# Patient Record
Sex: MALE | Race: WHITE | Employment: OTHER | URBAN - METROPOLITAN AREA
[De-identification: names, ages, dates, MRNs, and addresses within clinical notes are randomized per-mention and may not be internally consistent; named-entity substitution may affect disease eponyms.]

---

## 2022-01-01 ENCOUNTER — HOSPITAL ENCOUNTER (INPATIENT)
Age: 87
LOS: 1 days | DRG: 951 | End: 2022-04-22
Attending: EMERGENCY MEDICINE | Admitting: INTERNAL MEDICINE
Payer: MEDICARE

## 2022-01-01 ENCOUNTER — APPOINTMENT (OUTPATIENT)
Dept: GENERAL RADIOLOGY | Age: 87
DRG: 492 | End: 2022-01-01
Attending: ORTHOPAEDIC SURGERY
Payer: MEDICARE

## 2022-01-01 ENCOUNTER — APPOINTMENT (OUTPATIENT)
Dept: INTERVENTIONAL RADIOLOGY/VASCULAR | Age: 87
DRG: 492 | End: 2022-01-01
Attending: ORTHOPAEDIC SURGERY
Payer: MEDICARE

## 2022-01-01 ENCOUNTER — APPOINTMENT (OUTPATIENT)
Dept: GENERAL RADIOLOGY | Age: 87
DRG: 951 | End: 2022-01-01
Payer: MEDICARE

## 2022-01-01 ENCOUNTER — HOSPITAL ENCOUNTER (INPATIENT)
Age: 87
LOS: 7 days | Discharge: NURSING, REHAB OR LONG-TERM CARE | DRG: 492 | End: 2022-04-07
Attending: ORTHOPAEDIC SURGERY | Admitting: ORTHOPAEDIC SURGERY
Payer: MEDICARE

## 2022-01-01 ENCOUNTER — APPOINTMENT (OUTPATIENT)
Dept: CT IMAGING | Age: 87
DRG: 951 | End: 2022-01-01
Payer: MEDICARE

## 2022-01-01 ENCOUNTER — ANESTHESIA (OUTPATIENT)
Dept: OPERATING ROOM | Age: 87
DRG: 492 | End: 2022-01-01
Payer: MEDICARE

## 2022-01-01 ENCOUNTER — ANESTHESIA EVENT (OUTPATIENT)
Dept: OPERATING ROOM | Age: 87
DRG: 492 | End: 2022-01-01
Payer: MEDICARE

## 2022-01-01 VITALS
RESPIRATION RATE: 11 BRPM | HEIGHT: 63 IN | OXYGEN SATURATION: 83 % | TEMPERATURE: 94.4 F | SYSTOLIC BLOOD PRESSURE: 50 MMHG | DIASTOLIC BLOOD PRESSURE: 43 MMHG | HEART RATE: 115 BPM | WEIGHT: 139.11 LBS | BODY MASS INDEX: 24.65 KG/M2

## 2022-01-01 VITALS
HEART RATE: 68 BPM | OXYGEN SATURATION: 92 % | TEMPERATURE: 97.5 F | BODY MASS INDEX: 22.77 KG/M2 | WEIGHT: 128.53 LBS | DIASTOLIC BLOOD PRESSURE: 64 MMHG | HEIGHT: 63 IN | SYSTOLIC BLOOD PRESSURE: 107 MMHG | RESPIRATION RATE: 16 BRPM

## 2022-01-01 VITALS
SYSTOLIC BLOOD PRESSURE: 99 MMHG | DIASTOLIC BLOOD PRESSURE: 67 MMHG | TEMPERATURE: 96.8 F | OXYGEN SATURATION: 92 % | RESPIRATION RATE: 16 BRPM

## 2022-01-01 DIAGNOSIS — R74.01 TRANSAMINITIS: ICD-10-CM

## 2022-01-01 DIAGNOSIS — T85.848A PAIN DUE TO ANY DEVICE, IMPLANT OR GRAFT, INITIAL ENCOUNTER: ICD-10-CM

## 2022-01-01 DIAGNOSIS — I48.91 ATRIAL FIBRILLATION WITH RVR (HCC): ICD-10-CM

## 2022-01-01 DIAGNOSIS — T84.84XA PAINFUL ORTHOPAEDIC HARDWARE (HCC): Primary | ICD-10-CM

## 2022-01-01 DIAGNOSIS — J18.9 PNEUMONIA OF BOTH LUNGS DUE TO INFECTIOUS ORGANISM, UNSPECIFIED PART OF LUNG: ICD-10-CM

## 2022-01-01 DIAGNOSIS — J96.01 ACUTE HYPOXEMIC RESPIRATORY FAILURE (HCC): Primary | ICD-10-CM

## 2022-01-01 LAB
A/G RATIO: 0.7 (ref 1.1–2.2)
A/G RATIO: 0.8 (ref 1.1–2.2)
ALBUMIN SERPL-MCNC: 3 G/DL (ref 3.4–5)
ALBUMIN SERPL-MCNC: 3.1 G/DL (ref 3.4–5)
ALBUMIN SERPL-MCNC: 3.2 G/DL (ref 3.4–5)
ALBUMIN SERPL-MCNC: 3.4 G/DL (ref 3.4–5)
ALBUMIN SERPL-MCNC: 3.4 G/DL (ref 3.4–5)
ALBUMIN SERPL-MCNC: 3.5 G/DL (ref 3.4–5)
ALP BLD-CCNC: 537 U/L (ref 40–129)
ALP BLD-CCNC: 589 U/L (ref 40–129)
ALT SERPL-CCNC: 2100 U/L (ref 10–40)
ALT SERPL-CCNC: 2224 U/L (ref 10–40)
ANAEROBIC CULTURE: ABNORMAL
ANION GAP SERPL CALCULATED.3IONS-SCNC: 10 MMOL/L (ref 3–16)
ANION GAP SERPL CALCULATED.3IONS-SCNC: 11 MMOL/L (ref 3–16)
ANION GAP SERPL CALCULATED.3IONS-SCNC: 11 MMOL/L (ref 3–16)
ANION GAP SERPL CALCULATED.3IONS-SCNC: 14 MMOL/L (ref 3–16)
ANION GAP SERPL CALCULATED.3IONS-SCNC: 19 MMOL/L (ref 3–16)
ANION GAP SERPL CALCULATED.3IONS-SCNC: 20 MMOL/L (ref 3–16)
ANION GAP SERPL CALCULATED.3IONS-SCNC: 8 MMOL/L (ref 3–16)
ANION GAP SERPL CALCULATED.3IONS-SCNC: 9 MMOL/L (ref 3–16)
APTT: 33.1 SEC (ref 26.2–38.6)
AST SERPL-CCNC: 2605 U/L (ref 15–37)
AST SERPL-CCNC: 2773 U/L (ref 15–37)
BACTERIA: ABNORMAL /HPF
BACTERIA: ABNORMAL /HPF
BASE EXCESS VENOUS: -13.6 MMOL/L (ref -3–3)
BASE EXCESS VENOUS: 0.3 MMOL/L (ref -3–3)
BASOPHILS ABSOLUTE: 0 K/UL (ref 0–0.2)
BASOPHILS ABSOLUTE: 0 K/UL (ref 0–0.2)
BASOPHILS RELATIVE PERCENT: 0.2 %
BASOPHILS RELATIVE PERCENT: 0.9 %
BILIRUB SERPL-MCNC: 1.1 MG/DL (ref 0–1)
BILIRUB SERPL-MCNC: 1.1 MG/DL (ref 0–1)
BILIRUBIN URINE: NEGATIVE
BILIRUBIN URINE: NEGATIVE
BLOOD CULTURE, ROUTINE: NORMAL
BLOOD, URINE: ABNORMAL
BLOOD, URINE: NEGATIVE
BUN BLDV-MCNC: 17 MG/DL (ref 7–20)
BUN BLDV-MCNC: 20 MG/DL (ref 7–20)
BUN BLDV-MCNC: 21 MG/DL (ref 7–20)
BUN BLDV-MCNC: 27 MG/DL (ref 7–20)
BUN BLDV-MCNC: 35 MG/DL (ref 7–20)
BUN BLDV-MCNC: 43 MG/DL (ref 7–20)
BUN BLDV-MCNC: 63 MG/DL (ref 7–20)
BUN BLDV-MCNC: 64 MG/DL (ref 7–20)
C-REACTIVE PROTEIN: 44.8 MG/L (ref 0–5.1)
C-REACTIVE PROTEIN: 62.6 MG/L (ref 0–5.1)
CALCIUM SERPL-MCNC: 8.5 MG/DL (ref 8.3–10.6)
CALCIUM SERPL-MCNC: 8.8 MG/DL (ref 8.3–10.6)
CALCIUM SERPL-MCNC: 8.9 MG/DL (ref 8.3–10.6)
CALCIUM SERPL-MCNC: 9 MG/DL (ref 8.3–10.6)
CALCIUM SERPL-MCNC: 9 MG/DL (ref 8.3–10.6)
CALCIUM SERPL-MCNC: 9.1 MG/DL (ref 8.3–10.6)
CALCIUM SERPL-MCNC: 9.2 MG/DL (ref 8.3–10.6)
CALCIUM SERPL-MCNC: 9.3 MG/DL (ref 8.3–10.6)
CARBOXYHEMOGLOBIN: 1.6 % (ref 0–1.5)
CARBOXYHEMOGLOBIN: 2.5 % (ref 0–1.5)
CHLORIDE BLD-SCNC: 100 MMOL/L (ref 99–110)
CHLORIDE BLD-SCNC: 101 MMOL/L (ref 99–110)
CHLORIDE BLD-SCNC: 103 MMOL/L (ref 99–110)
CHLORIDE BLD-SCNC: 104 MMOL/L (ref 99–110)
CHLORIDE BLD-SCNC: 106 MMOL/L (ref 99–110)
CLARITY: CLEAR
CLARITY: CLEAR
CO2: 12 MMOL/L (ref 21–32)
CO2: 15 MMOL/L (ref 21–32)
CO2: 22 MMOL/L (ref 21–32)
CO2: 24 MMOL/L (ref 21–32)
CO2: 26 MMOL/L (ref 21–32)
CO2: 26 MMOL/L (ref 21–32)
CO2: 27 MMOL/L (ref 21–32)
CO2: 28 MMOL/L (ref 21–32)
COLOR: ABNORMAL
COLOR: YELLOW
CREAT SERPL-MCNC: 0.7 MG/DL (ref 0.8–1.3)
CREAT SERPL-MCNC: 0.7 MG/DL (ref 0.8–1.3)
CREAT SERPL-MCNC: 0.8 MG/DL (ref 0.8–1.3)
CREAT SERPL-MCNC: 0.9 MG/DL (ref 0.8–1.3)
CREAT SERPL-MCNC: 1.2 MG/DL (ref 0.8–1.3)
CREAT SERPL-MCNC: 1.2 MG/DL (ref 0.8–1.3)
CRYSTALS, UA: ABNORMAL /HPF
CULTURE SURGICAL: ABNORMAL
CULTURE, BLOOD 2: NORMAL
D DIMER: 1407 NG/ML DDU (ref 0–229)
EKG ATRIAL RATE: 105 BPM
EKG ATRIAL RATE: 74 BPM
EKG ATRIAL RATE: 76 BPM
EKG ATRIAL RATE: 81 BPM
EKG DIAGNOSIS: NORMAL
EKG P AXIS: 37 DEGREES
EKG P AXIS: 64 DEGREES
EKG P-R INTERVAL: 190 MS
EKG P-R INTERVAL: 192 MS
EKG Q-T INTERVAL: 294 MS
EKG Q-T INTERVAL: 352 MS
EKG Q-T INTERVAL: 406 MS
EKG Q-T INTERVAL: 410 MS
EKG QRS DURATION: 102 MS
EKG QRS DURATION: 78 MS
EKG QRS DURATION: 80 MS
EKG QRS DURATION: 88 MS
EKG QTC CALCULATION (BAZETT): 405 MS
EKG QTC CALCULATION (BAZETT): 425 MS
EKG QTC CALCULATION (BAZETT): 455 MS
EKG QTC CALCULATION (BAZETT): 456 MS
EKG R AXIS: 13 DEGREES
EKG R AXIS: 28 DEGREES
EKG R AXIS: 40 DEGREES
EKG R AXIS: 46 DEGREES
EKG T AXIS: -13 DEGREES
EKG T AXIS: 202 DEGREES
EKG T AXIS: 71 DEGREES
EKG T AXIS: 80 DEGREES
EKG VENTRICULAR RATE: 114 BPM
EKG VENTRICULAR RATE: 74 BPM
EKG VENTRICULAR RATE: 76 BPM
EKG VENTRICULAR RATE: 88 BPM
EOSINOPHILS ABSOLUTE: 0 K/UL (ref 0–0.6)
EOSINOPHILS ABSOLUTE: 0.6 K/UL (ref 0–0.6)
EOSINOPHILS RELATIVE PERCENT: 0.1 %
EOSINOPHILS RELATIVE PERCENT: 11.9 %
EPITHELIAL CELLS, UA: ABNORMAL /HPF (ref 0–5)
EPITHELIAL CELLS, UA: ABNORMAL /HPF (ref 0–5)
GFR AFRICAN AMERICAN: >60
GFR NON-AFRICAN AMERICAN: 57
GFR NON-AFRICAN AMERICAN: 57
GFR NON-AFRICAN AMERICAN: >60
GLUCOSE BLD-MCNC: 100 MG/DL (ref 70–99)
GLUCOSE BLD-MCNC: 101 MG/DL (ref 70–99)
GLUCOSE BLD-MCNC: 106 MG/DL (ref 70–99)
GLUCOSE BLD-MCNC: 119 MG/DL (ref 70–99)
GLUCOSE BLD-MCNC: 90 MG/DL (ref 70–99)
GLUCOSE BLD-MCNC: 90 MG/DL (ref 70–99)
GLUCOSE BLD-MCNC: 93 MG/DL (ref 70–99)
GLUCOSE BLD-MCNC: 95 MG/DL (ref 70–99)
GLUCOSE URINE: NEGATIVE MG/DL
GLUCOSE URINE: NEGATIVE MG/DL
GRAM STAIN RESULT: ABNORMAL
HCO3 VENOUS: 14.4 MMOL/L (ref 23–29)
HCO3 VENOUS: 26.4 MMOL/L (ref 23–29)
HCT VFR BLD CALC: 34 % (ref 40.5–52.5)
HCT VFR BLD CALC: 35.1 % (ref 40.5–52.5)
HCT VFR BLD CALC: 35.1 % (ref 40.5–52.5)
HCT VFR BLD CALC: 36 % (ref 40.5–52.5)
HCT VFR BLD CALC: 36 % (ref 40.5–52.5)
HCT VFR BLD CALC: 37.6 % (ref 40.5–52.5)
HCT VFR BLD CALC: 40.6 % (ref 40.5–52.5)
HEMOGLOBIN: 11.1 G/DL (ref 13.5–17.5)
HEMOGLOBIN: 11.3 G/DL (ref 13.5–17.5)
HEMOGLOBIN: 11.4 G/DL (ref 13.5–17.5)
HEMOGLOBIN: 11.4 G/DL (ref 13.5–17.5)
HEMOGLOBIN: 11.9 G/DL (ref 13.5–17.5)
HEMOGLOBIN: 12.2 G/DL (ref 13.5–17.5)
HEMOGLOBIN: 13 G/DL (ref 13.5–17.5)
INR BLD: 1.24 (ref 0.88–1.12)
INR BLD: 2.85 (ref 0.88–1.12)
KETONES, URINE: NEGATIVE MG/DL
KETONES, URINE: NEGATIVE MG/DL
LACTIC ACID: 0.8 MMOL/L (ref 0.4–2)
LACTIC ACID: 1.2 MMOL/L (ref 0.4–2)
LACTIC ACID: 1.3 MMOL/L (ref 0.4–2)
LACTIC ACID: 3.2 MMOL/L (ref 0.4–2)
LEUKOCYTE ESTERASE, URINE: ABNORMAL
LEUKOCYTE ESTERASE, URINE: ABNORMAL
LV EF: 65 %
LVEF MODALITY: NORMAL
LYMPHOCYTES ABSOLUTE: 0.2 K/UL (ref 1–5.1)
LYMPHOCYTES ABSOLUTE: 0.8 K/UL (ref 1–5.1)
LYMPHOCYTES RELATIVE PERCENT: 1.8 %
LYMPHOCYTES RELATIVE PERCENT: 16.5 %
MAGNESIUM: 1.8 MG/DL (ref 1.8–2.4)
MAGNESIUM: 1.9 MG/DL (ref 1.8–2.4)
MAGNESIUM: 1.9 MG/DL (ref 1.8–2.4)
MCH RBC QN AUTO: 28.5 PG (ref 26–34)
MCH RBC QN AUTO: 28.6 PG (ref 26–34)
MCH RBC QN AUTO: 28.6 PG (ref 26–34)
MCH RBC QN AUTO: 28.9 PG (ref 26–34)
MCH RBC QN AUTO: 29 PG (ref 26–34)
MCH RBC QN AUTO: 29.1 PG (ref 26–34)
MCHC RBC AUTO-ENTMCNC: 31.5 G/DL (ref 31–36)
MCHC RBC AUTO-ENTMCNC: 31.6 G/DL (ref 31–36)
MCHC RBC AUTO-ENTMCNC: 32.6 G/DL (ref 31–36)
MCHC RBC AUTO-ENTMCNC: 32.6 G/DL (ref 31–36)
MCHC RBC AUTO-ENTMCNC: 33.1 G/DL (ref 31–36)
MCHC RBC AUTO-ENTMCNC: 33.2 G/DL (ref 31–36)
MCV RBC AUTO: 86.2 FL (ref 80–100)
MCV RBC AUTO: 87.7 FL (ref 80–100)
MCV RBC AUTO: 88.8 FL (ref 80–100)
MCV RBC AUTO: 89.3 FL (ref 80–100)
MCV RBC AUTO: 90.5 FL (ref 80–100)
MCV RBC AUTO: 90.6 FL (ref 80–100)
METHEMOGLOBIN VENOUS: 0.3 %
METHEMOGLOBIN VENOUS: 0.7 %
MICROSCOPIC EXAMINATION: YES
MICROSCOPIC EXAMINATION: YES
MONOCYTES ABSOLUTE: 0.6 K/UL (ref 0–1.3)
MONOCYTES ABSOLUTE: 0.7 K/UL (ref 0–1.3)
MONOCYTES RELATIVE PERCENT: 11.6 %
MONOCYTES RELATIVE PERCENT: 5.6 %
NEUTROPHILS ABSOLUTE: 12.3 K/UL (ref 1.7–7.7)
NEUTROPHILS ABSOLUTE: 3 K/UL (ref 1.7–7.7)
NEUTROPHILS RELATIVE PERCENT: 59.1 %
NEUTROPHILS RELATIVE PERCENT: 92.3 %
NITRITE, URINE: NEGATIVE
NITRITE, URINE: POSITIVE
O2 SAT, VEN: 81 %
O2 SAT, VEN: 86 %
O2 THERAPY: ABNORMAL
O2 THERAPY: ABNORMAL
ORGANISM: ABNORMAL
PCO2, VEN: 41.3 MMHG (ref 40–50)
PCO2, VEN: 48.3 MMHG (ref 40–50)
PDW BLD-RTO: 15.8 % (ref 12.4–15.4)
PDW BLD-RTO: 16.2 % (ref 12.4–15.4)
PDW BLD-RTO: 16.3 % (ref 12.4–15.4)
PDW BLD-RTO: 16.4 % (ref 12.4–15.4)
PDW BLD-RTO: 16.5 % (ref 12.4–15.4)
PDW BLD-RTO: 16.8 % (ref 12.4–15.4)
PH UA: 5 (ref 5–8)
PH UA: 5 (ref 5–8)
PH VENOUS: 7.16 (ref 7.35–7.45)
PH VENOUS: 7.36 (ref 7.35–7.45)
PHOSPHORUS: 2.8 MG/DL (ref 2.5–4.9)
PHOSPHORUS: 3.1 MG/DL (ref 2.5–4.9)
PHOSPHORUS: 3.2 MG/DL (ref 2.5–4.9)
PHOSPHORUS: 3.3 MG/DL (ref 2.5–4.9)
PLATELET # BLD: 116 K/UL (ref 135–450)
PLATELET # BLD: 198 K/UL (ref 135–450)
PLATELET # BLD: 216 K/UL (ref 135–450)
PLATELET # BLD: 219 K/UL (ref 135–450)
PLATELET # BLD: 224 K/UL (ref 135–450)
PLATELET # BLD: 231 K/UL (ref 135–450)
PMV BLD AUTO: 8.4 FL (ref 5–10.5)
PMV BLD AUTO: 8.6 FL (ref 5–10.5)
PMV BLD AUTO: 8.7 FL (ref 5–10.5)
PMV BLD AUTO: 9.3 FL (ref 5–10.5)
PMV BLD AUTO: 9.3 FL (ref 5–10.5)
PMV BLD AUTO: 9.9 FL (ref 5–10.5)
PO2, VEN: 48 MMHG (ref 25–40)
PO2, VEN: 70.3 MMHG (ref 25–40)
POTASSIUM REFLEX MAGNESIUM: 4.4 MMOL/L (ref 3.5–5.1)
POTASSIUM REFLEX MAGNESIUM: 5.4 MMOL/L (ref 3.5–5.1)
POTASSIUM SERPL-SCNC: 3.6 MMOL/L (ref 3.5–5.1)
POTASSIUM SERPL-SCNC: 4.1 MMOL/L (ref 3.5–5.1)
POTASSIUM SERPL-SCNC: 4.3 MMOL/L (ref 3.5–5.1)
POTASSIUM SERPL-SCNC: 4.8 MMOL/L (ref 3.5–5.1)
POTASSIUM SERPL-SCNC: 5 MMOL/L (ref 3.5–5.1)
POTASSIUM SERPL-SCNC: 5.1 MMOL/L (ref 3.5–5.1)
PRO-BNP: 3076 PG/ML (ref 0–449)
PRO-BNP: 8762 PG/ML (ref 0–449)
PROCALCITONIN: 0.19 NG/ML (ref 0–0.15)
PROCALCITONIN: 0.47 NG/ML (ref 0–0.15)
PROTEIN UA: NEGATIVE MG/DL
PROTEIN UA: NEGATIVE MG/DL
PROTHROMBIN TIME: 14.1 SEC (ref 9.9–12.7)
PROTHROMBIN TIME: 33.7 SEC (ref 9.9–12.7)
RAPID INFLUENZA  B AGN: NEGATIVE
RAPID INFLUENZA A AGN: NEGATIVE
RBC # BLD: 3.87 M/UL (ref 4.2–5.9)
RBC # BLD: 3.94 M/UL (ref 4.2–5.9)
RBC # BLD: 3.96 M/UL (ref 4.2–5.9)
RBC # BLD: 3.97 M/UL (ref 4.2–5.9)
RBC # BLD: 4.11 M/UL (ref 4.2–5.9)
RBC # BLD: 4.21 M/UL (ref 4.2–5.9)
RBC UA: ABNORMAL /HPF (ref 0–4)
RBC UA: ABNORMAL /HPF (ref 0–4)
SARS-COV-2, NAAT: NOT DETECTED
SARS-COV-2, NAAT: NOT DETECTED
SEDIMENTATION RATE, ERYTHROCYTE: 24 MM/HR (ref 0–20)
SEDIMENTATION RATE, ERYTHROCYTE: 79 MM/HR (ref 0–20)
SODIUM BLD-SCNC: 134 MMOL/L (ref 136–145)
SODIUM BLD-SCNC: 135 MMOL/L (ref 136–145)
SODIUM BLD-SCNC: 136 MMOL/L (ref 136–145)
SODIUM BLD-SCNC: 136 MMOL/L (ref 136–145)
SODIUM BLD-SCNC: 137 MMOL/L (ref 136–145)
SODIUM BLD-SCNC: 138 MMOL/L (ref 136–145)
SODIUM BLD-SCNC: 139 MMOL/L (ref 136–145)
SODIUM BLD-SCNC: 141 MMOL/L (ref 136–145)
SPECIFIC GRAVITY UA: 1.02 (ref 1–1.03)
SPECIFIC GRAVITY UA: 1.02 (ref 1–1.03)
SPECIMEN STATUS: NORMAL
T4 FREE: 1.4 NG/DL (ref 0.9–1.8)
T4 FREE: 1.5 NG/DL (ref 0.9–1.8)
TCO2 CALC VENOUS: 16 MMOL/L
TCO2 CALC VENOUS: 28 MMOL/L
TOTAL PROTEIN: 6.7 G/DL (ref 6.4–8.2)
TOTAL PROTEIN: 7.3 G/DL (ref 6.4–8.2)
TROPONIN: 0.03 NG/ML
TROPONIN: <0.01 NG/ML
TSH REFLEX FT4: 5.62 UIU/ML (ref 0.27–4.2)
TSH SERPL DL<=0.05 MIU/L-ACNC: 4.24 UIU/ML (ref 0.27–4.2)
URINE REFLEX TO CULTURE: ABNORMAL
URINE REFLEX TO CULTURE: ABNORMAL
URINE TYPE: ABNORMAL
URINE TYPE: ABNORMAL
UROBILINOGEN, URINE: 0.2 E.U./DL
UROBILINOGEN, URINE: 0.2 E.U./DL
VANCOMYCIN RANDOM: 11 UG/ML
WBC # BLD: 13.3 K/UL (ref 4–11)
WBC # BLD: 5 K/UL (ref 4–11)
WBC # BLD: 5.9 K/UL (ref 4–11)
WBC # BLD: 6.2 K/UL (ref 4–11)
WBC # BLD: 6.6 K/UL (ref 4–11)
WBC # BLD: 7.5 K/UL (ref 4–11)
WBC UA: ABNORMAL /HPF (ref 0–5)
WBC UA: ABNORMAL /HPF (ref 0–5)

## 2022-01-01 PROCEDURE — 85610 PROTHROMBIN TIME: CPT

## 2022-01-01 PROCEDURE — 6370000000 HC RX 637 (ALT 250 FOR IP): Performed by: ORTHOPAEDIC SURGERY

## 2022-01-01 PROCEDURE — 71045 X-RAY EXAM CHEST 1 VIEW: CPT

## 2022-01-01 PROCEDURE — 84145 PROCALCITONIN (PCT): CPT

## 2022-01-01 PROCEDURE — 6360000002 HC RX W HCPCS: Performed by: ORTHOPAEDIC SURGERY

## 2022-01-01 PROCEDURE — 84484 ASSAY OF TROPONIN QUANT: CPT

## 2022-01-01 PROCEDURE — 73620 X-RAY EXAM OF FOOT: CPT

## 2022-01-01 PROCEDURE — 83605 ASSAY OF LACTIC ACID: CPT

## 2022-01-01 PROCEDURE — 6360000002 HC RX W HCPCS: Performed by: EMERGENCY MEDICINE

## 2022-01-01 PROCEDURE — 93010 ELECTROCARDIOGRAM REPORT: CPT | Performed by: INTERNAL MEDICINE

## 2022-01-01 PROCEDURE — 2700000000 HC OXYGEN THERAPY PER DAY

## 2022-01-01 PROCEDURE — 87040 BLOOD CULTURE FOR BACTERIA: CPT

## 2022-01-01 PROCEDURE — 2580000003 HC RX 258: Performed by: ORTHOPAEDIC SURGERY

## 2022-01-01 PROCEDURE — 7100000001 HC PACU RECOVERY - ADDTL 15 MIN: Performed by: ORTHOPAEDIC SURGERY

## 2022-01-01 PROCEDURE — 97530 THERAPEUTIC ACTIVITIES: CPT

## 2022-01-01 PROCEDURE — 6360000002 HC RX W HCPCS: Performed by: INTERNAL MEDICINE

## 2022-01-01 PROCEDURE — 36415 COLL VENOUS BLD VENIPUNCTURE: CPT

## 2022-01-01 PROCEDURE — 6360000002 HC RX W HCPCS: Performed by: HOSPITALIST

## 2022-01-01 PROCEDURE — 02H633Z INSERTION OF INFUSION DEVICE INTO RIGHT ATRIUM, PERCUTANEOUS APPROACH: ICD-10-PCS | Performed by: RADIOLOGY

## 2022-01-01 PROCEDURE — 99233 SBSQ HOSP IP/OBS HIGH 50: CPT | Performed by: INTERNAL MEDICINE

## 2022-01-01 PROCEDURE — 82803 BLOOD GASES ANY COMBINATION: CPT

## 2022-01-01 PROCEDURE — 85027 COMPLETE CBC AUTOMATED: CPT

## 2022-01-01 PROCEDURE — 99232 SBSQ HOSP IP/OBS MODERATE 35: CPT | Performed by: INTERNAL MEDICINE

## 2022-01-01 PROCEDURE — 94761 N-INVAS EAR/PLS OXIMETRY MLT: CPT

## 2022-01-01 PROCEDURE — 96376 TX/PRO/DX INJ SAME DRUG ADON: CPT

## 2022-01-01 PROCEDURE — 96365 THER/PROPH/DIAG IV INF INIT: CPT

## 2022-01-01 PROCEDURE — 96375 TX/PRO/DX INJ NEW DRUG ADDON: CPT

## 2022-01-01 PROCEDURE — 2580000003 HC RX 258: Performed by: NURSE ANESTHETIST, CERTIFIED REGISTERED

## 2022-01-01 PROCEDURE — 0QBJ0ZZ EXCISION OF RIGHT FIBULA, OPEN APPROACH: ICD-10-PCS | Performed by: ORTHOPAEDIC SURGERY

## 2022-01-01 PROCEDURE — 83880 ASSAY OF NATRIURETIC PEPTIDE: CPT

## 2022-01-01 PROCEDURE — 86140 C-REACTIVE PROTEIN: CPT

## 2022-01-01 PROCEDURE — 6370000000 HC RX 637 (ALT 250 FOR IP): Performed by: INTERNAL MEDICINE

## 2022-01-01 PROCEDURE — 2500000003 HC RX 250 WO HCPCS: Performed by: ANESTHESIOLOGY

## 2022-01-01 PROCEDURE — 87804 INFLUENZA ASSAY W/OPTIC: CPT

## 2022-01-01 PROCEDURE — 2580000003 HC RX 258: Performed by: INTERNAL MEDICINE

## 2022-01-01 PROCEDURE — C1769 GUIDE WIRE: HCPCS

## 2022-01-01 PROCEDURE — 3600000014 HC SURGERY LEVEL 4 ADDTL 15MIN: Performed by: ORTHOPAEDIC SURGERY

## 2022-01-01 PROCEDURE — 87186 SC STD MICRODIL/AGAR DIL: CPT

## 2022-01-01 PROCEDURE — 84439 ASSAY OF FREE THYROXINE: CPT

## 2022-01-01 PROCEDURE — 87205 SMEAR GRAM STAIN: CPT

## 2022-01-01 PROCEDURE — 84443 ASSAY THYROID STIM HORMONE: CPT

## 2022-01-01 PROCEDURE — A4217 STERILE WATER/SALINE, 500 ML: HCPCS | Performed by: ORTHOPAEDIC SURGERY

## 2022-01-01 PROCEDURE — 80048 BASIC METABOLIC PNL TOTAL CA: CPT

## 2022-01-01 PROCEDURE — 93005 ELECTROCARDIOGRAM TRACING: CPT | Performed by: HOSPITALIST

## 2022-01-01 PROCEDURE — 85018 HEMOGLOBIN: CPT

## 2022-01-01 PROCEDURE — 2500000003 HC RX 250 WO HCPCS: Performed by: NURSE ANESTHETIST, CERTIFIED REGISTERED

## 2022-01-01 PROCEDURE — 80202 ASSAY OF VANCOMYCIN: CPT

## 2022-01-01 PROCEDURE — 2580000003 HC RX 258: Performed by: EMERGENCY MEDICINE

## 2022-01-01 PROCEDURE — 71046 X-RAY EXAM CHEST 2 VIEWS: CPT

## 2022-01-01 PROCEDURE — 97110 THERAPEUTIC EXERCISES: CPT

## 2022-01-01 PROCEDURE — 81001 URINALYSIS AUTO W/SCOPE: CPT

## 2022-01-01 PROCEDURE — 7100000000 HC PACU RECOVERY - FIRST 15 MIN: Performed by: ORTHOPAEDIC SURGERY

## 2022-01-01 PROCEDURE — 2580000003 HC RX 258: Performed by: ANESTHESIOLOGY

## 2022-01-01 PROCEDURE — 51702 INSERT TEMP BLADDER CATH: CPT

## 2022-01-01 PROCEDURE — 6360000002 HC RX W HCPCS: Performed by: NURSE PRACTITIONER

## 2022-01-01 PROCEDURE — 6360000002 HC RX W HCPCS: Performed by: NURSE ANESTHETIST, CERTIFIED REGISTERED

## 2022-01-01 PROCEDURE — 97162 PT EVAL MOD COMPLEX 30 MIN: CPT

## 2022-01-01 PROCEDURE — 94640 AIRWAY INHALATION TREATMENT: CPT

## 2022-01-01 PROCEDURE — 93306 TTE W/DOPPLER COMPLETE: CPT

## 2022-01-01 PROCEDURE — 87075 CULTR BACTERIA EXCEPT BLOOD: CPT

## 2022-01-01 PROCEDURE — 99223 1ST HOSP IP/OBS HIGH 75: CPT | Performed by: INTERNAL MEDICINE

## 2022-01-01 PROCEDURE — 99222 1ST HOSP IP/OBS MODERATE 55: CPT | Performed by: INTERNAL MEDICINE

## 2022-01-01 PROCEDURE — 93005 ELECTROCARDIOGRAM TRACING: CPT | Performed by: INTERNAL MEDICINE

## 2022-01-01 PROCEDURE — 99285 EMERGENCY DEPT VISIT HI MDM: CPT

## 2022-01-01 PROCEDURE — 87635 SARS-COV-2 COVID-19 AMP PRB: CPT

## 2022-01-01 PROCEDURE — 93005 ELECTROCARDIOGRAM TRACING: CPT | Performed by: EMERGENCY MEDICINE

## 2022-01-01 PROCEDURE — 87077 CULTURE AEROBIC IDENTIFY: CPT

## 2022-01-01 PROCEDURE — 1200000000 HC SEMI PRIVATE

## 2022-01-01 PROCEDURE — 85379 FIBRIN DEGRADATION QUANT: CPT

## 2022-01-01 PROCEDURE — 2709999900 HC NON-CHARGEABLE SUPPLY: Performed by: ORTHOPAEDIC SURGERY

## 2022-01-01 PROCEDURE — 3600000004 HC SURGERY LEVEL 4 BASE: Performed by: ORTHOPAEDIC SURGERY

## 2022-01-01 PROCEDURE — 85730 THROMBOPLASTIN TIME PARTIAL: CPT

## 2022-01-01 PROCEDURE — 80053 COMPREHEN METABOLIC PANEL: CPT

## 2022-01-01 PROCEDURE — 0QPJ04Z REMOVAL OF INTERNAL FIXATION DEVICE FROM RIGHT FIBULA, OPEN APPROACH: ICD-10-PCS | Performed by: ORTHOPAEDIC SURGERY

## 2022-01-01 PROCEDURE — 6360000004 HC RX CONTRAST MEDICATION: Performed by: EMERGENCY MEDICINE

## 2022-01-01 PROCEDURE — 3700000001 HC ADD 15 MINUTES (ANESTHESIA): Performed by: ORTHOPAEDIC SURGERY

## 2022-01-01 PROCEDURE — 93005 ELECTROCARDIOGRAM TRACING: CPT | Performed by: ANESTHESIOLOGY

## 2022-01-01 PROCEDURE — 94660 CPAP INITIATION&MGMT: CPT

## 2022-01-01 PROCEDURE — 85025 COMPLETE CBC W/AUTO DIFF WBC: CPT

## 2022-01-01 PROCEDURE — 83735 ASSAY OF MAGNESIUM: CPT

## 2022-01-01 PROCEDURE — 80069 RENAL FUNCTION PANEL: CPT

## 2022-01-01 PROCEDURE — 3700000000 HC ANESTHESIA ATTENDED CARE: Performed by: ORTHOPAEDIC SURGERY

## 2022-01-01 PROCEDURE — 2500000003 HC RX 250 WO HCPCS: Performed by: ORTHOPAEDIC SURGERY

## 2022-01-01 PROCEDURE — 85652 RBC SED RATE AUTOMATED: CPT

## 2022-01-01 PROCEDURE — 87070 CULTURE OTHR SPECIMN AEROBIC: CPT

## 2022-01-01 PROCEDURE — 85014 HEMATOCRIT: CPT

## 2022-01-01 PROCEDURE — 71260 CT THORAX DX C+: CPT

## 2022-01-01 RX ORDER — METHYLPREDNISOLONE SODIUM SUCCINATE 125 MG/2ML
125 INJECTION, POWDER, LYOPHILIZED, FOR SOLUTION INTRAMUSCULAR; INTRAVENOUS ONCE
Status: COMPLETED | OUTPATIENT
Start: 2022-01-01 | End: 2022-01-01

## 2022-01-01 RX ORDER — SODIUM CHLORIDE 9 MG/ML
25 INJECTION, SOLUTION INTRAVENOUS PRN
Status: DISCONTINUED | OUTPATIENT
Start: 2022-01-01 | End: 2022-01-01 | Stop reason: HOSPADM

## 2022-01-01 RX ORDER — ACETAMINOPHEN 325 MG/1
650 TABLET ORAL EVERY 6 HOURS PRN
Status: DISCONTINUED | OUTPATIENT
Start: 2022-01-01 | End: 2022-01-01 | Stop reason: HOSPADM

## 2022-01-01 RX ORDER — OXYCODONE HYDROCHLORIDE 5 MG/1
10 TABLET ORAL PRN
Status: DISCONTINUED | OUTPATIENT
Start: 2022-01-01 | End: 2022-01-01 | Stop reason: HOSPADM

## 2022-01-01 RX ORDER — SODIUM CHLORIDE 0.9 % (FLUSH) 0.9 %
10 SYRINGE (ML) INJECTION EVERY 12 HOURS SCHEDULED
Status: DISCONTINUED | OUTPATIENT
Start: 2022-01-01 | End: 2022-01-01 | Stop reason: HOSPADM

## 2022-01-01 RX ORDER — SODIUM CHLORIDE 9 MG/ML
INJECTION, SOLUTION INTRAVENOUS PRN
Status: DISCONTINUED | OUTPATIENT
Start: 2022-01-01 | End: 2022-01-01 | Stop reason: HOSPADM

## 2022-01-01 RX ORDER — ONDANSETRON 2 MG/ML
INJECTION INTRAMUSCULAR; INTRAVENOUS PRN
Status: DISCONTINUED | OUTPATIENT
Start: 2022-01-01 | End: 2022-01-01 | Stop reason: SDUPTHER

## 2022-01-01 RX ORDER — PROPOFOL 10 MG/ML
INJECTION, EMULSION INTRAVENOUS PRN
Status: DISCONTINUED | OUTPATIENT
Start: 2022-01-01 | End: 2022-01-01 | Stop reason: SDUPTHER

## 2022-01-01 RX ORDER — ONDANSETRON 4 MG/1
4 TABLET, ORALLY DISINTEGRATING ORAL EVERY 8 HOURS PRN
Status: DISCONTINUED | OUTPATIENT
Start: 2022-01-01 | End: 2022-01-01 | Stop reason: HOSPADM

## 2022-01-01 RX ORDER — SODIUM CHLORIDE 0.9 % (FLUSH) 0.9 %
5-40 SYRINGE (ML) INJECTION PRN
Status: DISCONTINUED | OUTPATIENT
Start: 2022-01-01 | End: 2022-01-01 | Stop reason: HOSPADM

## 2022-01-01 RX ORDER — SODIUM CHLORIDE 0.9 % (FLUSH) 0.9 %
10 SYRINGE (ML) INJECTION PRN
Status: DISCONTINUED | OUTPATIENT
Start: 2022-01-01 | End: 2022-01-01 | Stop reason: HOSPADM

## 2022-01-01 RX ORDER — MEPERIDINE HYDROCHLORIDE 50 MG/ML
12.5 INJECTION INTRAMUSCULAR; INTRAVENOUS; SUBCUTANEOUS EVERY 5 MIN PRN
Status: DISCONTINUED | OUTPATIENT
Start: 2022-01-01 | End: 2022-01-01 | Stop reason: HOSPADM

## 2022-01-01 RX ORDER — CEPHALEXIN 500 MG/1
500 CAPSULE ORAL 4 TIMES DAILY
COMMUNITY

## 2022-01-01 RX ORDER — FUROSEMIDE 10 MG/ML
20 INJECTION INTRAMUSCULAR; INTRAVENOUS ONCE
Status: COMPLETED | OUTPATIENT
Start: 2022-01-01 | End: 2022-01-01

## 2022-01-01 RX ORDER — POLYETHYLENE GLYCOL 3350 17 G/17G
17 POWDER, FOR SOLUTION ORAL ONCE
Status: COMPLETED | OUTPATIENT
Start: 2022-01-01 | End: 2022-01-01

## 2022-01-01 RX ORDER — CETIRIZINE HYDROCHLORIDE 5 MG/1
5 TABLET ORAL DAILY
Status: DISCONTINUED | OUTPATIENT
Start: 2022-01-01 | End: 2022-01-01 | Stop reason: HOSPADM

## 2022-01-01 RX ORDER — LEVALBUTEROL 1.25 MG/.5ML
0.63 SOLUTION, CONCENTRATE RESPIRATORY (INHALATION) EVERY 8 HOURS PRN
Status: DISCONTINUED | OUTPATIENT
Start: 2022-01-01 | End: 2022-01-01

## 2022-01-01 RX ORDER — SODIUM CHLORIDE 0.9 % (FLUSH) 0.9 %
5-40 SYRINGE (ML) INJECTION EVERY 12 HOURS SCHEDULED
Status: DISCONTINUED | OUTPATIENT
Start: 2022-01-01 | End: 2022-01-01 | Stop reason: HOSPADM

## 2022-01-01 RX ORDER — SODIUM CHLORIDE, SODIUM LACTATE, POTASSIUM CHLORIDE, CALCIUM CHLORIDE 600; 310; 30; 20 MG/100ML; MG/100ML; MG/100ML; MG/100ML
INJECTION, SOLUTION INTRAVENOUS CONTINUOUS
Status: DISCONTINUED | OUTPATIENT
Start: 2022-01-01 | End: 2022-01-01

## 2022-01-01 RX ORDER — ACETAMINOPHEN 500 MG
1000 TABLET ORAL
Status: COMPLETED | OUTPATIENT
Start: 2022-01-01 | End: 2022-01-01

## 2022-01-01 RX ORDER — MAGNESIUM SULFATE IN WATER 40 MG/ML
2000 INJECTION, SOLUTION INTRAVENOUS ONCE
Status: COMPLETED | OUTPATIENT
Start: 2022-01-01 | End: 2022-01-01

## 2022-01-01 RX ORDER — ATROPINE SULFATE 10 MG/ML
1 SOLUTION/ DROPS OPHTHALMIC EVERY 4 HOURS PRN
Status: DISCONTINUED | OUTPATIENT
Start: 2022-01-01 | End: 2022-01-01 | Stop reason: HOSPADM

## 2022-01-01 RX ORDER — PANTOPRAZOLE SODIUM 40 MG/1
40 TABLET, DELAYED RELEASE ORAL
Status: DISCONTINUED | OUTPATIENT
Start: 2022-01-01 | End: 2022-01-01 | Stop reason: HOSPADM

## 2022-01-01 RX ORDER — LORAZEPAM 2 MG/ML
1 INJECTION INTRAMUSCULAR
Status: DISCONTINUED | OUTPATIENT
Start: 2022-01-01 | End: 2022-01-01

## 2022-01-01 RX ORDER — ACETAMINOPHEN 650 MG/1
650 SUPPOSITORY RECTAL EVERY 6 HOURS PRN
Status: DISCONTINUED | OUTPATIENT
Start: 2022-01-01 | End: 2022-01-01 | Stop reason: HOSPADM

## 2022-01-01 RX ORDER — PHENYLEPHRINE HCL IN 0.9% NACL 1 MG/10 ML
SYRINGE (ML) INTRAVENOUS PRN
Status: DISCONTINUED | OUTPATIENT
Start: 2022-01-01 | End: 2022-01-01 | Stop reason: SDUPTHER

## 2022-01-01 RX ORDER — IBUPROFEN 600 MG/1
600 TABLET ORAL EVERY 6 HOURS PRN
Status: DISCONTINUED | OUTPATIENT
Start: 2022-01-01 | End: 2022-01-01 | Stop reason: HOSPADM

## 2022-01-01 RX ORDER — ONDANSETRON 2 MG/ML
4 INJECTION INTRAMUSCULAR; INTRAVENOUS
Status: DISCONTINUED | OUTPATIENT
Start: 2022-01-01 | End: 2022-01-01 | Stop reason: HOSPADM

## 2022-01-01 RX ORDER — LEVALBUTEROL 1.25 MG/.5ML
0.63 SOLUTION, CONCENTRATE RESPIRATORY (INHALATION) EVERY 8 HOURS
Status: DISCONTINUED | OUTPATIENT
Start: 2022-01-01 | End: 2022-01-01

## 2022-01-01 RX ORDER — ATORVASTATIN CALCIUM 40 MG/1
40 TABLET, FILM COATED ORAL NIGHTLY
Status: DISCONTINUED | OUTPATIENT
Start: 2022-01-01 | End: 2022-01-01 | Stop reason: HOSPADM

## 2022-01-01 RX ORDER — BUPIVACAINE HYDROCHLORIDE 5 MG/ML
INJECTION, SOLUTION EPIDURAL; INTRACAUDAL PRN
Status: DISCONTINUED | OUTPATIENT
Start: 2022-01-01 | End: 2022-01-01 | Stop reason: ALTCHOICE

## 2022-01-01 RX ORDER — MORPHINE SULFATE 4 MG/ML
4 INJECTION, SOLUTION INTRAMUSCULAR; INTRAVENOUS
Status: DISCONTINUED | OUTPATIENT
Start: 2022-01-01 | End: 2022-01-01 | Stop reason: HOSPADM

## 2022-01-01 RX ORDER — FUROSEMIDE 10 MG/ML
20 INJECTION INTRAMUSCULAR; INTRAVENOUS 2 TIMES DAILY
Status: DISCONTINUED | OUTPATIENT
Start: 2022-01-01 | End: 2022-01-01 | Stop reason: HOSPADM

## 2022-01-01 RX ORDER — POLYETHYLENE GLYCOL 3350 17 G/17G
17 POWDER, FOR SOLUTION ORAL DAILY
Status: DISCONTINUED | OUTPATIENT
Start: 2022-01-01 | End: 2022-01-01 | Stop reason: HOSPADM

## 2022-01-01 RX ORDER — POTASSIUM CHLORIDE 20 MEQ/1
40 TABLET, EXTENDED RELEASE ORAL ONCE
Status: COMPLETED | OUTPATIENT
Start: 2022-01-01 | End: 2022-01-01

## 2022-01-01 RX ORDER — LORAZEPAM 2 MG/ML
1 INJECTION INTRAMUSCULAR
Status: DISCONTINUED | OUTPATIENT
Start: 2022-01-01 | End: 2022-01-01 | Stop reason: HOSPADM

## 2022-01-01 RX ORDER — LEVALBUTEROL 1.25 MG/.5ML
0.63 SOLUTION, CONCENTRATE RESPIRATORY (INHALATION) EVERY 4 HOURS PRN
Status: DISCONTINUED | OUTPATIENT
Start: 2022-01-01 | End: 2022-01-01 | Stop reason: HOSPADM

## 2022-01-01 RX ORDER — OXYCODONE HYDROCHLORIDE 5 MG/1
5 TABLET ORAL PRN
Status: DISCONTINUED | OUTPATIENT
Start: 2022-01-01 | End: 2022-01-01 | Stop reason: HOSPADM

## 2022-01-01 RX ORDER — LORAZEPAM 2 MG/ML
1 INJECTION INTRAMUSCULAR EVERY 4 HOURS PRN
Status: DISCONTINUED | OUTPATIENT
Start: 2022-01-01 | End: 2022-01-01

## 2022-01-01 RX ORDER — LEVETIRACETAM 500 MG/1
500 TABLET ORAL 2 TIMES DAILY
Status: DISCONTINUED | OUTPATIENT
Start: 2022-01-01 | End: 2022-01-01 | Stop reason: HOSPADM

## 2022-01-01 RX ORDER — TAMSULOSIN HYDROCHLORIDE 0.4 MG/1
0.4 CAPSULE ORAL DAILY
Status: DISCONTINUED | OUTPATIENT
Start: 2022-01-01 | End: 2022-01-01 | Stop reason: HOSPADM

## 2022-01-01 RX ORDER — MORPHINE SULFATE 4 MG/ML
4 INJECTION, SOLUTION INTRAMUSCULAR; INTRAVENOUS
Status: DISCONTINUED | OUTPATIENT
Start: 2022-01-01 | End: 2022-01-01

## 2022-01-01 RX ORDER — MORPHINE SULFATE 4 MG/ML
4 INJECTION, SOLUTION INTRAMUSCULAR; INTRAVENOUS ONCE
Status: COMPLETED | OUTPATIENT
Start: 2022-01-01 | End: 2022-01-01

## 2022-01-01 RX ORDER — OXYCODONE HYDROCHLORIDE AND ACETAMINOPHEN 5; 325 MG/1; MG/1
1 TABLET ORAL EVERY 6 HOURS PRN
Qty: 28 TABLET | Refills: 0 | Status: SHIPPED | OUTPATIENT
Start: 2022-01-01 | End: 2022-01-01

## 2022-01-01 RX ORDER — SODIUM CHLORIDE, SODIUM LACTATE, POTASSIUM CHLORIDE, CALCIUM CHLORIDE 600; 310; 30; 20 MG/100ML; MG/100ML; MG/100ML; MG/100ML
INJECTION, SOLUTION INTRAVENOUS CONTINUOUS PRN
Status: DISCONTINUED | OUTPATIENT
Start: 2022-01-01 | End: 2022-01-01 | Stop reason: SDUPTHER

## 2022-01-01 RX ORDER — ONDANSETRON 2 MG/ML
4 INJECTION INTRAMUSCULAR; INTRAVENOUS EVERY 6 HOURS PRN
Status: DISCONTINUED | OUTPATIENT
Start: 2022-01-01 | End: 2022-01-01 | Stop reason: HOSPADM

## 2022-01-01 RX ORDER — LEVETIRACETAM 500 MG/1
500 TABLET ORAL 2 TIMES DAILY
COMMUNITY

## 2022-01-01 RX ORDER — ROCURONIUM BROMIDE 10 MG/ML
INJECTION, SOLUTION INTRAVENOUS PRN
Status: DISCONTINUED | OUTPATIENT
Start: 2022-01-01 | End: 2022-01-01 | Stop reason: SDUPTHER

## 2022-01-01 RX ORDER — LIDOCAINE HYDROCHLORIDE 10 MG/ML
INJECTION, SOLUTION INFILTRATION; PERINEURAL PRN
Status: DISCONTINUED | OUTPATIENT
Start: 2022-01-01 | End: 2022-01-01 | Stop reason: SDUPTHER

## 2022-01-01 RX ORDER — POLYETHYLENE GLYCOL 3350 17 G/17G
17 POWDER, FOR SOLUTION ORAL DAILY PRN
Status: DISCONTINUED | OUTPATIENT
Start: 2022-01-01 | End: 2022-01-01 | Stop reason: HOSPADM

## 2022-01-01 RX ADMIN — SODIUM CHLORIDE, PRESERVATIVE FREE 10 ML: 5 INJECTION INTRAVENOUS at 20:42

## 2022-01-01 RX ADMIN — SODIUM CHLORIDE, PRESERVATIVE FREE 10 ML: 5 INJECTION INTRAVENOUS at 21:09

## 2022-01-01 RX ADMIN — TAMSULOSIN HYDROCHLORIDE 0.4 MG: 0.4 CAPSULE ORAL at 10:58

## 2022-01-01 RX ADMIN — LEVETIRACETAM 500 MG: 500 TABLET, FILM COATED ORAL at 08:55

## 2022-01-01 RX ADMIN — SUGAMMADEX 200 MG: 100 INJECTION, SOLUTION INTRAVENOUS at 15:48

## 2022-01-01 RX ADMIN — FUROSEMIDE 20 MG: 10 INJECTION, SOLUTION INTRAMUSCULAR; INTRAVENOUS at 17:20

## 2022-01-01 RX ADMIN — VANCOMYCIN HYDROCHLORIDE 1000 MG: 1 INJECTION, POWDER, LYOPHILIZED, FOR SOLUTION INTRAVENOUS at 10:58

## 2022-01-01 RX ADMIN — FUROSEMIDE 20 MG: 10 INJECTION, SOLUTION INTRAMUSCULAR; INTRAVENOUS at 21:53

## 2022-01-01 RX ADMIN — FUROSEMIDE 20 MG: 10 INJECTION, SOLUTION INTRAMUSCULAR; INTRAVENOUS at 10:57

## 2022-01-01 RX ADMIN — SODIUM CHLORIDE, PRESERVATIVE FREE 10 ML: 5 INJECTION INTRAVENOUS at 09:42

## 2022-01-01 RX ADMIN — IOPAMIDOL 75 ML: 755 INJECTION, SOLUTION INTRAVENOUS at 16:05

## 2022-01-01 RX ADMIN — MORPHINE SULFATE 4 MG: 4 INJECTION, SOLUTION INTRAMUSCULAR; INTRAVENOUS at 14:48

## 2022-01-01 RX ADMIN — LEVETIRACETAM 500 MG: 500 TABLET, FILM COATED ORAL at 09:42

## 2022-01-01 RX ADMIN — SODIUM CHLORIDE 25 ML: 9 INJECTION, SOLUTION INTRAVENOUS at 17:25

## 2022-01-01 RX ADMIN — IPRATROPIUM BROMIDE 0.5 MG: 0.5 SOLUTION RESPIRATORY (INHALATION) at 08:50

## 2022-01-01 RX ADMIN — POLYETHYLENE GLYCOL 3350 17 G: 17 POWDER, FOR SOLUTION ORAL at 12:13

## 2022-01-01 RX ADMIN — SODIUM CHLORIDE, PRESERVATIVE FREE 10 ML: 5 INJECTION INTRAVENOUS at 09:17

## 2022-01-01 RX ADMIN — ASPIRIN 325 MG: 325 TABLET, COATED ORAL at 20:40

## 2022-01-01 RX ADMIN — SODIUM CHLORIDE, PRESERVATIVE FREE 10 ML: 5 INJECTION INTRAVENOUS at 20:24

## 2022-01-01 RX ADMIN — TAMSULOSIN HYDROCHLORIDE 0.4 MG: 0.4 CAPSULE ORAL at 09:22

## 2022-01-01 RX ADMIN — CETIRIZINE HYDROCHLORIDE 5 MG: 5 TABLET, FILM COATED ORAL at 09:42

## 2022-01-01 RX ADMIN — POTASSIUM CHLORIDE 40 MEQ: 20 TABLET, EXTENDED RELEASE ORAL at 14:31

## 2022-01-01 RX ADMIN — LORAZEPAM 1 MG: 2 INJECTION INTRAMUSCULAR; INTRAVENOUS at 14:48

## 2022-01-01 RX ADMIN — CETIRIZINE HYDROCHLORIDE 5 MG: 5 TABLET, FILM COATED ORAL at 09:22

## 2022-01-01 RX ADMIN — VANCOMYCIN HYDROCHLORIDE 1000 MG: 1 INJECTION, POWDER, LYOPHILIZED, FOR SOLUTION INTRAVENOUS at 16:57

## 2022-01-01 RX ADMIN — FUROSEMIDE 20 MG: 10 INJECTION, SOLUTION INTRAMUSCULAR; INTRAVENOUS at 09:22

## 2022-01-01 RX ADMIN — ASPIRIN 325 MG: 325 TABLET, COATED ORAL at 21:09

## 2022-01-01 RX ADMIN — PANTOPRAZOLE SODIUM 40 MG: 40 TABLET, DELAYED RELEASE ORAL at 06:23

## 2022-01-01 RX ADMIN — SODIUM CHLORIDE, PRESERVATIVE FREE 10 ML: 5 INJECTION INTRAVENOUS at 20:58

## 2022-01-01 RX ADMIN — ASPIRIN 325 MG: 325 TABLET, COATED ORAL at 21:10

## 2022-01-01 RX ADMIN — TAMSULOSIN HYDROCHLORIDE 0.4 MG: 0.4 CAPSULE ORAL at 10:57

## 2022-01-01 RX ADMIN — LEVETIRACETAM 500 MG: 500 TABLET, FILM COATED ORAL at 20:57

## 2022-01-01 RX ADMIN — ROCURONIUM BROMIDE 50 MG: 10 SOLUTION INTRAVENOUS at 15:00

## 2022-01-01 RX ADMIN — ASPIRIN 325 MG: 325 TABLET, COATED ORAL at 20:24

## 2022-01-01 RX ADMIN — IBUPROFEN 600 MG: 600 TABLET ORAL at 20:24

## 2022-01-01 RX ADMIN — CETIRIZINE HYDROCHLORIDE 5 MG: 5 TABLET, FILM COATED ORAL at 10:57

## 2022-01-01 RX ADMIN — SODIUM CHLORIDE, PRESERVATIVE FREE 10 ML: 5 INJECTION INTRAVENOUS at 09:22

## 2022-01-01 RX ADMIN — PROPOFOL 80 MG: 10 INJECTION, EMULSION INTRAVENOUS at 15:00

## 2022-01-01 RX ADMIN — FUROSEMIDE 20 MG: 10 INJECTION, SOLUTION INTRAMUSCULAR; INTRAVENOUS at 10:58

## 2022-01-01 RX ADMIN — PANTOPRAZOLE SODIUM 40 MG: 40 TABLET, DELAYED RELEASE ORAL at 06:12

## 2022-01-01 RX ADMIN — CEFEPIME HYDROCHLORIDE 2000 MG: 2 INJECTION, POWDER, FOR SOLUTION INTRAVENOUS at 17:35

## 2022-01-01 RX ADMIN — ATORVASTATIN CALCIUM 40 MG: 40 TABLET, FILM COATED ORAL at 20:57

## 2022-01-01 RX ADMIN — METHYLPREDNISOLONE SODIUM SUCCINATE 125 MG: 125 INJECTION, POWDER, FOR SOLUTION INTRAMUSCULAR; INTRAVENOUS at 15:47

## 2022-01-01 RX ADMIN — ASPIRIN 325 MG: 325 TABLET, COATED ORAL at 10:58

## 2022-01-01 RX ADMIN — FUROSEMIDE 20 MG: 10 INJECTION, SOLUTION INTRAMUSCULAR; INTRAVENOUS at 09:42

## 2022-01-01 RX ADMIN — SODIUM CHLORIDE, PRESERVATIVE FREE 10 ML: 5 INJECTION INTRAVENOUS at 11:01

## 2022-01-01 RX ADMIN — TAMSULOSIN HYDROCHLORIDE 0.4 MG: 0.4 CAPSULE ORAL at 11:00

## 2022-01-01 RX ADMIN — ACETAMINOPHEN 650 MG: 325 TABLET ORAL at 21:53

## 2022-01-01 RX ADMIN — PANTOPRAZOLE SODIUM 40 MG: 40 TABLET, DELAYED RELEASE ORAL at 09:42

## 2022-01-01 RX ADMIN — IPRATROPIUM BROMIDE 0.5 MG: 0.5 SOLUTION RESPIRATORY (INHALATION) at 23:40

## 2022-01-01 RX ADMIN — Medication 100 MCG: at 15:07

## 2022-01-01 RX ADMIN — LORAZEPAM 1 MG: 2 INJECTION INTRAMUSCULAR; INTRAVENOUS at 02:02

## 2022-01-01 RX ADMIN — SODIUM CHLORIDE, PRESERVATIVE FREE 10 ML: 5 INJECTION INTRAVENOUS at 19:00

## 2022-01-01 RX ADMIN — TAMSULOSIN HYDROCHLORIDE 0.4 MG: 0.4 CAPSULE ORAL at 09:00

## 2022-01-01 RX ADMIN — FUROSEMIDE 20 MG: 10 INJECTION, SOLUTION INTRAMUSCULAR; INTRAVENOUS at 18:00

## 2022-01-01 RX ADMIN — VANCOMYCIN HYDROCHLORIDE 1000 MG: 1 INJECTION, POWDER, LYOPHILIZED, FOR SOLUTION INTRAVENOUS at 12:11

## 2022-01-01 RX ADMIN — FUROSEMIDE 20 MG: 10 INJECTION, SOLUTION INTRAMUSCULAR; INTRAVENOUS at 17:31

## 2022-01-01 RX ADMIN — LORAZEPAM 1 MG: 2 INJECTION INTRAMUSCULAR; INTRAVENOUS at 10:51

## 2022-01-01 RX ADMIN — FUROSEMIDE 20 MG: 10 INJECTION, SOLUTION INTRAMUSCULAR; INTRAVENOUS at 18:10

## 2022-01-01 RX ADMIN — SODIUM CHLORIDE, PRESERVATIVE FREE 10 ML: 5 INJECTION INTRAVENOUS at 11:00

## 2022-01-01 RX ADMIN — ASPIRIN 325 MG: 325 TABLET, COATED ORAL at 09:00

## 2022-01-01 RX ADMIN — PANTOPRAZOLE SODIUM 40 MG: 40 TABLET, DELAYED RELEASE ORAL at 06:10

## 2022-01-01 RX ADMIN — LEVETIRACETAM 500 MG: 500 TABLET, FILM COATED ORAL at 20:40

## 2022-01-01 RX ADMIN — FUROSEMIDE 20 MG: 10 INJECTION, SOLUTION INTRAMUSCULAR; INTRAVENOUS at 17:37

## 2022-01-01 RX ADMIN — MORPHINE SULFATE 4 MG: 4 INJECTION, SOLUTION INTRAMUSCULAR; INTRAVENOUS at 20:50

## 2022-01-01 RX ADMIN — FUROSEMIDE 20 MG: 10 INJECTION, SOLUTION INTRAMUSCULAR; INTRAVENOUS at 18:26

## 2022-01-01 RX ADMIN — MORPHINE SULFATE 4 MG: 4 INJECTION, SOLUTION INTRAMUSCULAR; INTRAVENOUS at 01:21

## 2022-01-01 RX ADMIN — METOPROLOL TARTRATE 25 MG: 25 TABLET, FILM COATED ORAL at 21:10

## 2022-01-01 RX ADMIN — SODIUM CHLORIDE, PRESERVATIVE FREE 10 ML: 5 INJECTION INTRAVENOUS at 10:57

## 2022-01-01 RX ADMIN — POLYETHYLENE GLYCOL 3350 17 G: 17 POWDER, FOR SOLUTION ORAL at 13:03

## 2022-01-01 RX ADMIN — CEFAZOLIN 2000 MG: 10 INJECTION, POWDER, FOR SOLUTION INTRAVENOUS at 23:14

## 2022-01-01 RX ADMIN — ASPIRIN 325 MG: 325 TABLET, COATED ORAL at 11:00

## 2022-01-01 RX ADMIN — MORPHINE SULFATE 4 MG: 4 INJECTION, SOLUTION INTRAMUSCULAR; INTRAVENOUS at 04:17

## 2022-01-01 RX ADMIN — CETIRIZINE HYDROCHLORIDE 5 MG: 5 TABLET, FILM COATED ORAL at 09:00

## 2022-01-01 RX ADMIN — ASPIRIN 325 MG: 325 TABLET, COATED ORAL at 09:41

## 2022-01-01 RX ADMIN — ACETAMINOPHEN 1000 MG: 500 TABLET ORAL at 12:18

## 2022-01-01 RX ADMIN — LORAZEPAM 1 MG: 2 INJECTION INTRAMUSCULAR; INTRAVENOUS at 18:42

## 2022-01-01 RX ADMIN — ATORVASTATIN CALCIUM 40 MG: 40 TABLET, FILM COATED ORAL at 21:10

## 2022-01-01 RX ADMIN — LEVETIRACETAM 500 MG: 500 TABLET, FILM COATED ORAL at 10:57

## 2022-01-01 RX ADMIN — CETIRIZINE HYDROCHLORIDE 5 MG: 5 TABLET, FILM COATED ORAL at 08:55

## 2022-01-01 RX ADMIN — MORPHINE SULFATE 4 MG: 4 INJECTION, SOLUTION INTRAMUSCULAR; INTRAVENOUS at 06:00

## 2022-01-01 RX ADMIN — LEVETIRACETAM 500 MG: 500 TABLET, FILM COATED ORAL at 21:53

## 2022-01-01 RX ADMIN — SODIUM CHLORIDE, PRESERVATIVE FREE 10 ML: 5 INJECTION INTRAVENOUS at 21:00

## 2022-01-01 RX ADMIN — LEVETIRACETAM 500 MG: 500 TABLET, FILM COATED ORAL at 10:58

## 2022-01-01 RX ADMIN — IPRATROPIUM BROMIDE 0.5 MG: 0.5 SOLUTION RESPIRATORY (INHALATION) at 23:46

## 2022-01-01 RX ADMIN — LEVETIRACETAM 500 MG: 500 TABLET, FILM COATED ORAL at 21:09

## 2022-01-01 RX ADMIN — LORAZEPAM 1 MG: 2 INJECTION INTRAMUSCULAR; INTRAVENOUS at 08:43

## 2022-01-01 RX ADMIN — TAMSULOSIN HYDROCHLORIDE 0.4 MG: 0.4 CAPSULE ORAL at 20:40

## 2022-01-01 RX ADMIN — LEVALBUTEROL HYDROCHLORIDE 0.63 MG: 1.25 SOLUTION, CONCENTRATE RESPIRATORY (INHALATION) at 23:40

## 2022-01-01 RX ADMIN — FUROSEMIDE 20 MG: 10 INJECTION, SOLUTION INTRAMUSCULAR; INTRAVENOUS at 15:47

## 2022-01-01 RX ADMIN — CETIRIZINE HYDROCHLORIDE 5 MG: 5 TABLET, FILM COATED ORAL at 11:01

## 2022-01-01 RX ADMIN — LEVETIRACETAM 500 MG: 500 TABLET, FILM COATED ORAL at 21:12

## 2022-01-01 RX ADMIN — MORPHINE SULFATE 4 MG: 4 INJECTION, SOLUTION INTRAMUSCULAR; INTRAVENOUS at 18:42

## 2022-01-01 RX ADMIN — LIDOCAINE HYDROCHLORIDE 50 MG: 10 INJECTION, SOLUTION INFILTRATION; PERINEURAL at 15:00

## 2022-01-01 RX ADMIN — LEVETIRACETAM 500 MG: 500 TABLET, FILM COATED ORAL at 09:00

## 2022-01-01 RX ADMIN — METOPROLOL TARTRATE 25 MG: 25 TABLET, FILM COATED ORAL at 08:55

## 2022-01-01 RX ADMIN — LEVETIRACETAM 500 MG: 500 TABLET, FILM COATED ORAL at 20:24

## 2022-01-01 RX ADMIN — LEVETIRACETAM 500 MG: 500 TABLET, FILM COATED ORAL at 09:22

## 2022-01-01 RX ADMIN — LEVALBUTEROL HYDROCHLORIDE 0.63 MG: 1.25 SOLUTION, CONCENTRATE RESPIRATORY (INHALATION) at 23:46

## 2022-01-01 RX ADMIN — LEVETIRACETAM 500 MG: 500 TABLET, FILM COATED ORAL at 11:00

## 2022-01-01 RX ADMIN — IBUPROFEN 600 MG: 600 TABLET ORAL at 21:09

## 2022-01-01 RX ADMIN — LORAZEPAM 1 MG: 2 INJECTION INTRAMUSCULAR; INTRAVENOUS at 23:12

## 2022-01-01 RX ADMIN — ASPIRIN 325 MG: 325 TABLET, COATED ORAL at 20:57

## 2022-01-01 RX ADMIN — ASPIRIN 325 MG: 325 TABLET, COATED ORAL at 08:55

## 2022-01-01 RX ADMIN — METOPROLOL TARTRATE 25 MG: 25 TABLET, FILM COATED ORAL at 14:31

## 2022-01-01 RX ADMIN — LORAZEPAM 1 MG: 2 INJECTION INTRAMUSCULAR; INTRAVENOUS at 04:12

## 2022-01-01 RX ADMIN — SODIUM CHLORIDE, PRESERVATIVE FREE 10 ML: 5 INJECTION INTRAVENOUS at 21:12

## 2022-01-01 RX ADMIN — VANCOMYCIN HYDROCHLORIDE 1000 MG: 1 INJECTION, POWDER, LYOPHILIZED, FOR SOLUTION INTRAVENOUS at 13:58

## 2022-01-01 RX ADMIN — SODIUM CHLORIDE, SODIUM LACTATE, POTASSIUM CHLORIDE, AND CALCIUM CHLORIDE: .6; .31; .03; .02 INJECTION, SOLUTION INTRAVENOUS at 15:00

## 2022-01-01 RX ADMIN — MORPHINE SULFATE 4 MG: 4 INJECTION, SOLUTION INTRAMUSCULAR; INTRAVENOUS at 02:57

## 2022-01-01 RX ADMIN — TAMSULOSIN HYDROCHLORIDE 0.4 MG: 0.4 CAPSULE ORAL at 09:41

## 2022-01-01 RX ADMIN — LORAZEPAM 1 MG: 2 INJECTION INTRAMUSCULAR; INTRAVENOUS at 05:57

## 2022-01-01 RX ADMIN — BISACODYL 5 MG: 5 TABLET, COATED ORAL at 12:12

## 2022-01-01 RX ADMIN — TAMSULOSIN HYDROCHLORIDE 0.4 MG: 0.4 CAPSULE ORAL at 08:55

## 2022-01-01 RX ADMIN — ASPIRIN 325 MG: 325 TABLET, COATED ORAL at 09:22

## 2022-01-01 RX ADMIN — MAGNESIUM SULFATE HEPTAHYDRATE 2000 MG: 40 INJECTION, SOLUTION INTRAVENOUS at 17:31

## 2022-01-01 RX ADMIN — LORAZEPAM 1 MG: 2 INJECTION INTRAMUSCULAR; INTRAVENOUS at 17:40

## 2022-01-01 RX ADMIN — MORPHINE SULFATE 4 MG: 4 INJECTION, SOLUTION INTRAMUSCULAR; INTRAVENOUS at 00:06

## 2022-01-01 RX ADMIN — ASPIRIN 325 MG: 325 TABLET, COATED ORAL at 21:53

## 2022-01-01 RX ADMIN — IBUPROFEN 600 MG: 600 TABLET ORAL at 21:12

## 2022-01-01 RX ADMIN — METOPROLOL TARTRATE 25 MG: 25 TABLET, FILM COATED ORAL at 09:22

## 2022-01-01 RX ADMIN — METOPROLOL TARTRATE 25 MG: 25 TABLET, FILM COATED ORAL at 20:57

## 2022-01-01 RX ADMIN — PANTOPRAZOLE SODIUM 40 MG: 40 TABLET, DELAYED RELEASE ORAL at 06:21

## 2022-01-01 RX ADMIN — PANTOPRAZOLE SODIUM 40 MG: 40 TABLET, DELAYED RELEASE ORAL at 06:18

## 2022-01-01 RX ADMIN — LORAZEPAM 1 MG: 2 INJECTION INTRAMUSCULAR; INTRAVENOUS at 13:07

## 2022-01-01 RX ADMIN — ONDANSETRON 4 MG: 2 INJECTION INTRAMUSCULAR; INTRAVENOUS at 15:07

## 2022-01-01 RX ADMIN — LEVETIRACETAM 500 MG: 500 TABLET, FILM COATED ORAL at 21:10

## 2022-01-01 RX ADMIN — FUROSEMIDE 20 MG: 10 INJECTION, SOLUTION INTRAMUSCULAR; INTRAVENOUS at 08:55

## 2022-01-01 RX ADMIN — ASPIRIN 325 MG: 325 TABLET, COATED ORAL at 10:57

## 2022-01-01 RX ADMIN — CEFAZOLIN 2000 MG: 10 INJECTION, POWDER, FOR SOLUTION INTRAVENOUS at 14:58

## 2022-01-01 RX ADMIN — SODIUM CHLORIDE, PRESERVATIVE FREE 10 ML: 5 INJECTION INTRAVENOUS at 21:11

## 2022-01-01 RX ADMIN — SODIUM CHLORIDE, POTASSIUM CHLORIDE, SODIUM LACTATE AND CALCIUM CHLORIDE: 600; 310; 30; 20 INJECTION, SOLUTION INTRAVENOUS at 12:15

## 2022-01-01 RX ADMIN — ASPIRIN 325 MG: 325 TABLET, COATED ORAL at 21:12

## 2022-01-01 RX ADMIN — CETIRIZINE HYDROCHLORIDE 5 MG: 5 TABLET, FILM COATED ORAL at 10:58

## 2022-01-01 RX ADMIN — FUROSEMIDE 20 MG: 10 INJECTION, SOLUTION INTRAMUSCULAR; INTRAVENOUS at 11:01

## 2022-01-01 RX ADMIN — LEVALBUTEROL HYDROCHLORIDE 1.25 MG: 1.25 SOLUTION, CONCENTRATE RESPIRATORY (INHALATION) at 08:50

## 2022-01-01 RX ADMIN — PANTOPRAZOLE SODIUM 40 MG: 40 TABLET, DELAYED RELEASE ORAL at 10:58

## 2022-01-01 RX ADMIN — SODIUM CHLORIDE, PRESERVATIVE FREE 10 ML: 5 INJECTION INTRAVENOUS at 08:55

## 2022-01-01 RX ADMIN — FAMOTIDINE 20 MG: 10 INJECTION, SOLUTION INTRAVENOUS at 12:18

## 2022-01-01 RX ADMIN — CEFAZOLIN 2000 MG: 10 INJECTION, POWDER, FOR SOLUTION INTRAVENOUS at 06:27

## 2022-01-01 ASSESSMENT — PAIN DESCRIPTION - FREQUENCY
FREQUENCY: INTERMITTENT
FREQUENCY: OTHER (COMMENT)

## 2022-01-01 ASSESSMENT — PULMONARY FUNCTION TESTS
PIF_VALUE: 19
PIF_VALUE: 1
PIF_VALUE: 1
PIF_VALUE: 10
PIF_VALUE: 19
PIF_VALUE: 20
PIF_VALUE: 20
PIF_VALUE: 19
PIF_VALUE: 18
PIF_VALUE: 0
PIF_VALUE: 20
PIF_VALUE: 10
PIF_VALUE: 19
PIF_VALUE: 20
PIF_VALUE: 19
PIF_VALUE: 30
PIF_VALUE: 29
PIF_VALUE: 19
PIF_VALUE: 19
PIF_VALUE: 1
PIF_VALUE: 19
PIF_VALUE: 3
PIF_VALUE: 19
PIF_VALUE: 15
PIF_VALUE: 0
PIF_VALUE: 1
PIF_VALUE: 1
PIF_VALUE: 19
PIF_VALUE: 21
PIF_VALUE: 19
PIF_VALUE: 1
PIF_VALUE: 20
PIF_VALUE: 20
PIF_VALUE: 19
PIF_VALUE: 20
PIF_VALUE: 21
PIF_VALUE: 15
PIF_VALUE: 19
PIF_VALUE: 20
PIF_VALUE: 1
PIF_VALUE: 15
PIF_VALUE: 0
PIF_VALUE: 1
PIF_VALUE: 20
PIF_VALUE: 19
PIF_VALUE: 19
PIF_VALUE: 10
PIF_VALUE: 19
PIF_VALUE: 15
PIF_VALUE: 19
PIF_VALUE: 19
PIF_VALUE: 20
PIF_VALUE: 19
PIF_VALUE: 20
PIF_VALUE: 15
PIF_VALUE: 19
PIF_VALUE: 16
PIF_VALUE: 19

## 2022-01-01 ASSESSMENT — PAIN SCALES - GENERAL
PAINLEVEL_OUTOF10: 5
PAINLEVEL_OUTOF10: 5
PAINLEVEL_OUTOF10: 0
PAINLEVEL_OUTOF10: 10
PAINLEVEL_OUTOF10: 0
PAINLEVEL_OUTOF10: 0
PAINLEVEL_OUTOF10: 5
PAINLEVEL_OUTOF10: 0
PAINLEVEL_OUTOF10: 0
PAINLEVEL_OUTOF10: 7
PAINLEVEL_OUTOF10: 0
PAINLEVEL_OUTOF10: 4
PAINLEVEL_OUTOF10: 4
PAINLEVEL_OUTOF10: 0
PAINLEVEL_OUTOF10: 5
PAINLEVEL_OUTOF10: 4
PAINLEVEL_OUTOF10: 0
PAINLEVEL_OUTOF10: 0
PAINLEVEL_OUTOF10: 8

## 2022-01-01 ASSESSMENT — PAIN DESCRIPTION - LOCATION
LOCATION: GENERALIZED
LOCATION: ANKLE
LOCATION: GENERALIZED
LOCATION: ANKLE
LOCATION: GENERALIZED
LOCATION: GENERALIZED
LOCATION: ANKLE

## 2022-01-01 ASSESSMENT — PAIN SCALES - WONG BAKER
WONGBAKER_NUMERICALRESPONSE: 6

## 2022-01-01 ASSESSMENT — PAIN - FUNCTIONAL ASSESSMENT: PAIN_FUNCTIONAL_ASSESSMENT: 0-10

## 2022-01-01 ASSESSMENT — ENCOUNTER SYMPTOMS
TACHYPNEA: 1
SHORTNESS OF BREATH: 0

## 2022-01-01 ASSESSMENT — PAIN DESCRIPTION - PAIN TYPE
TYPE: SURGICAL PAIN
TYPE: SURGICAL PAIN

## 2022-01-01 ASSESSMENT — LIFESTYLE VARIABLES: SMOKING_STATUS: 0

## 2022-01-01 ASSESSMENT — PAIN DESCRIPTION - ORIENTATION: ORIENTATION: RIGHT

## 2022-01-01 ASSESSMENT — PAIN DESCRIPTION - PROGRESSION: CLINICAL_PROGRESSION: OTHER (COMMENT)

## 2022-01-01 ASSESSMENT — PAIN DESCRIPTION - ONSET: ONSET: OTHER (COMMENT)

## 2022-03-28 NOTE — PROGRESS NOTES
Lashawn Prado    Age 80 y.o.    male    1934    MRN [id number]      3/31/2022  Arrival Time_____________  OR Time____________[address]     Procedure(s):  RIGHT ANKLE HARDWARE REMOVAL WITH MINI C-ARM AND INTERSCALENE BLOCK FOR PAIN CONTROL                      General     Surgeon(s):  Daniel Bailey, MD      DAY ADMIT ___  SDS/OP ___  OUTPT IN BED ___      Phone [phone] (home)     PCP _____________________ Phone_________________ Epic ( ) Epic CE ( ) Appt ________    ADDITIONAL INFO __________________________________ Cardio/Consult _____________    NOTES _____________________________________________________________________    ____________________________________________________________________________    PAT APPT DATE:________ TIME: ________  FAXED QAD: _______  (__) H&P w/ Hospitalist  ____________________________________________________________________________  NURSING HISTORY COMPLETE: ______  (__) CBC       (__) W/ DIFF ___________         (__) Hgb A1C    ___________  (__) CHEST X RAY   __________  (__) LIPID PROFILE  ___________  (__) EKG   __________  (__) PT/PTT   ___________  (__) PFT's   __________  (__) BMP   ___________  (__) CAROTIDS  __________  (__) CMP   ___________  (__) VEIN MAPPING  __________  (__) U/A   ___________  (__) HISTORY & PHYSICAL __________  (__) URINE C & S  ___________  (__) CARDIAC CLEARANCE __________  (__) U/A W/ FLEX  ___________  (__) PULM.  CLEARANCE __________  (__) SERUM PREGNANCY ___________  (__) Check Epic DOS orders __________  (__) TYPE & SCREEN ________ repeat ( ) (__)  __________________ __________  (__) ALBUMIN Kym Reagin ___________  (__)  __________________ __________  (__) TRANSFERRIN  ___________  (__)  __________________ __________  (__) LIVER PROFILE  ___________  (__)  __________________ __________  (__) MRSA NASAL SWAB ___________  (__) URINE PREG DOS __________  (__) SED RATE  ___________  (__) BLOOD SUGAR DOS __________  (__) C-REACTIVE PROTEIN ___________    (__) VITAMIN D HYDROXY ___________  (__) BLOOD THINNERS __________    (__) ACE/ ARBS: _____________________    (__) BETABLOCKERS ___________________

## 2022-03-29 NOTE — PROGRESS NOTES
Silas Ramírez (sister/POA) notified of patient's surgery date/time/location, she plans to be here DOS

## 2022-03-31 PROBLEM — S90.551A: Status: ACTIVE | Noted: 2022-01-01

## 2022-03-31 PROBLEM — L08.9: Status: ACTIVE | Noted: 2022-01-01

## 2022-03-31 NOTE — ANESTHESIA POSTPROCEDURE EVALUATION
Department of Anesthesiology  Postprocedure Note    Patient: Sherry Gallo  MRN: [id number]  YOB: 1934  Date of evaluation: 3/31/2022  Time:  4:45 PM     Procedure Summary     Date: 03/31/22 Room / Location: Count includes the [facility]    Anesthesia Start: 9329 Anesthesia Stop: 0276    Procedure: RIGHT ANKLE HARDWARE REMOVAL (Right Ankle) Diagnosis:       Pain due to any device, implant or graft, initial encounter      (PAIN FROM IMPLANTED HARDWARE, RIGHT ANKLE PAIN)    Surgeons: Suzanne Abarca MD Responsible Provider: Sergei Cowan MD    Anesthesia Type: General ASA Status: 3          Anesthesia Type: General    Verónica Phase I: Verónica Score: 7    Verónica Phase II:      Last vitals: Reviewed and per EMR flowsheets.    Vitals:    03/31/22 1615 03/31/22 1620 03/31/22 1625 03/31/22 1630   BP: (!) 94/44 100/62 (!) 97/57 (!) 96/55   Pulse: 71 71 68 67   Resp: 14 27 21 17   Temp:       TempSrc:       SpO2: 93% 92% 95% 94%   Weight:       Height:           Anesthesia Post Evaluation    Patient location during evaluation: bedside  Patient participation: complete - patient participated  Level of consciousness: awake and alert  Airway patency: patent  Nausea & Vomiting: no nausea  Complications: no  Cardiovascular status: hemodynamically stable  Respiratory status: acceptable  Hydration status: euvolemic

## 2022-03-31 NOTE — PROGRESS NOTES
4 Eyes Skin Assessment     The patient is being assess for   Transfer to New Unit    I agree that 2 RN's have performed a thorough Head to Toe Skin Assessment on the patient. ALL assessment sites listed below have been assessed. Areas assessed for pressure by both nurses:   [x]   Head, Face, and Ears   [x]   Shoulders, Back, and Chest, Abdomen  [x]   Arms, Elbows, and Hands   [x]   Coccyx, Sacrum, and Ischium  [x]   Legs, Feet, and Heels        Skin Assessed Under all Medical Devices by both nurses:  O2 device tubing              All Mepilex Borders were peeled back and area peeked at by both nurses:  Yes  Please list where Mepilex Borders are located:  Coccyx red, blanchable, left heel old scar, black scab noted. **SHARE this note so that the co-signing nurse is able to place an eSignature**    Co-signer eSignature: Electronically signed by Arnol Green RN on 3/31/22 at 5:41 PM EDT    Does the Patient have Skin Breakdown related to pressure?   Yes LDA WOUND CARE was Initiated documentation include the Roberta-wound, Wound Assessment, Measurements, Dressing Treatment, Drainage, and Color\",            Spenser Prevention initiated:  Yes   Wound Care Orders initiated:  Yes      [id number] nurse consulted for Pressure Injury (Stage 3,4, Unstageable, DTI, NWPT, Complex wounds)and New or Established Ostomies:  NA      Primary Nurse eSignature: Electronically signed by Domonique Cobb RN on 3/31/22 at 5:52 PM EDT

## 2022-03-31 NOTE — PLAN OF CARE
Problem: Falls - Risk of:  Goal: Will remain free from falls  Description: Will remain free from falls  Outcome: Ongoing  Goal: Absence of physical injury  Description: Absence of physical injury  Outcome: Ongoing     Problem: Infection:  Goal: Will remain free from infection  Description: Will remain free from infection  Outcome: Ongoing     Problem: Safety:  Goal: Free from accidental physical injury  Description: Free from accidental physical injury  Outcome: Ongoing  Goal: Free from intentional harm  Description: Free from intentional harm  Outcome: Ongoing     Problem: Pain:  Goal: Patient's pain/discomfort is manageable  Description: Patient's pain/discomfort is manageable  Outcome: Ongoing     Problem: Skin Integrity:  Goal: Skin integrity will stabilize  Description: Skin integrity will stabilize  Outcome: Ongoing

## 2022-03-31 NOTE — PROGRESS NOTES
A: Bedside report given to [facility], all current drips, treatments, skin issues, and plan of care were reviewed by both RN's, patients care transferred with patient in stable condition. D: Patient transported to [facility] via bed, 02 and all belongings.

## 2022-03-31 NOTE — PROGRESS NOTES
Pt transferred to room 534. Pt lying on left side. Pt moaned when getting blood pressure, quickly after fell asleep. VSS. No grimacing noted. Skin assessment completed with Jaqueline Henson RN in PACU. SCD applied to left calf, repositioned for comfort. All safety measures in place. Bed alrm on, call light near.

## 2022-03-31 NOTE — OP NOTE
Operative Note      Patient: Samantha Beebe  YOB: 1934  MRN: [id number]    Date of Procedure: 3/31/2022    Pre-Op Diagnosis: PAIN FROM IMPLANTED HARDWARE, RIGHT ANKLE PAIN    Post-Op Diagnosis: Superficial cellulitis right ankle both medial and lateral, concerns for osteomyelitis of the distal fibula       Procedure(s):  RIGHT ANKLE HARDWARE REMOVAL;  #1. Right ankle removal of hardware through 2 separate incisions; 2 medial malleolar screws, one distal fibular plate  #2. Extensive debridement including skin, subcutaneous fascia and bone from the distal fibula for concerns of infection and osteomyelitis    Surgeon(s):  Aziza Wise MD    Assistant:   Surgical Assistant: Kristian Lara  Physician Assistant: Man Anne PA-C please note the presence of the physician assistant was medically required including positioning of the limb, adequate exposure, assistance in debridement as notable above    Anesthesia: General    Estimated Blood Loss (mL): less than 50     Complications: None    Specimens:   ID Type Source Tests Collected by Time Destination   1 : BONE RIGHT DISTAL FIBULA Bone Bone CULTURE, SURGICAL Aziza Wise MD 3/31/2022 1533        Implants:  * No implants in log *      Drains: * No LDAs found *    Findings: Please see operative note    Detailed Description of Procedure: This patient is an 28-year-old gentleman with dementia, nonambulatory, comes in from a nursing facility. He presented to the office with exposed hardware over the distal fibula, superficial erythema and scant drainage. 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 Arrangements were made for urgent removal of hardware and extensive debridement as outlined above. Informed consent included the possibility of persistent infection and the need for further surgeries. Informed consent was discussed with the patient. This included a detailed description of the procedure.   Risks, benefits and alternatives specific to this diagnosis and procedure were outlined. Standard surgical risks of anesthesia, bleeding, nerve damage, infection, need for further surgeries, disability and death also outlined. Verbal confirmation of informed consent was obtained. Signature obtained by the staff. Patient was identified and marked. The informed consent process had been previously performed with the power of . He was come to the operative suite. After the induction anesthesia tourniquet was applied to the distal thigh. The limb was elevated and meticulously prepped and draped. Surgical timeout was carried out. We began medially where some definite cellulitis was appreciated. There was a roughly 2 x 2 centimeter eschar. I excised this eschar with an elliptical incision and created skin flaps. Notably I did not appreciate any reena purulence. The 2 malleolar screws were removed quite easily without complication. I found the bone quality here to be reasonable although I did curette the screw holes and debrided any nonviable tissue. I irrigated with dilute Betadine. I closed this area with nylon. Subsequently removed to the lateral aspect. Again superficial cellulitis was tracking up the calf. I used the previous incision. Please note that the second screw from distal was completely exposed and protruding through the skin. There was minimal drainage. I developed skin flaps. I removed fibrotic tissue. I loosened each of the screws and they were removed sequentially. The plate was gently pried away from the bone. However, I would note that the bone quality over the area of the exposed screw was extremely soft. I obtained specimen of bone which was sent to the lab for aerobic and anaerobic cultures to evaluate for osteomyelitis. At this point I irrigated copiously with a dilute Betadine solution and saline. I closed the wound with mattress nylon sutures.     We applied a Xeroform, of soft sterile compressive dressing and wrap. Anesthesia was reversed and he was come to the holding area in stable condition. Postoperative plan will be for admission to the hospital for IV antibiotics. We will follow the cultures to see if osteomyelitis develops or if further surgeries are indicated. Otherwise his plan will be for return to the nursing facility.     Electronically signed by Raegan Gibson MD on 3/31/2022 at 3:46 PM

## 2022-03-31 NOTE — BRIEF OP NOTE
Brief Postoperative Note      Patient: Carol Jauregui  YOB: 1934  MRN: [id number]    Date of Procedure: 3/31/2022    Pre-Op Diagnosis: PAIN FROM IMPLANTED HARDWARE, RIGHT ANKLE PAIN    Post-Op Diagnosis: cellulitis, possible osteomyelitis       Procedure(s):  RIGHT ANKLE HARDWARE REMOVAL    Surgeon(s):  Mayra Tompkins MD    Assistant:  Surgical Assistant: Zulema Lesch  Physician Assistant: Zeeshan Najera PA-C    Anesthesia: General    Estimated Blood Loss (mL): less than 50     Complications: None    Specimens:   ID Type Source Tests Collected by Time Destination   1 : BONE RIGHT DISTAL FIBULA Bone Bone CULTURE, SURGICAL Mayra Tompkins MD 3/31/2022 1533        Implants:  * No implants in log *      Drains: * No LDAs found *    Findings: please see OP Note    Electronically signed by Mayra Tompkins MD on 3/31/2022 at 3:46 PM

## 2022-03-31 NOTE — INTERVAL H&P NOTE
Update History & Physical    The patient's History and Physical of March 31, AND was reviewed with the patient and I examined the patient. There was no change. The surgical site was confirmed by the patient and me. Plan: The risks, benefits, expected outcome, and alternative to the recommended procedure have been discussed with the patient. Patient understands and wants to proceed with the procedure.      Electronically signed by Ana Petersen MD on 3/31/2022 at 2:47 PM

## 2022-03-31 NOTE — ANESTHESIA PRE PROCEDURE
Department of Anesthesiology  Preprocedure Note       Name:  Catalina Cabrales   Age:  80 y.o.  :  1934                                          MRN:  [id number]         Date:  3/31/2022      Surgeon: Hunter Rivera):  Raegan Ojeda MD    Procedure: Procedure(s):  RIGHT ANKLE HARDWARE REMOVAL WITH MINI C-ARM AND INTERSCALENE BLOCK FOR PAIN CONTROL    Medications prior to admission:   Prior to Admission medications    Medication Sig Start Date End Date Taking? Authorizing Provider   levETIRAcetam (KEPPRA) 500 MG tablet Take 500 mg by mouth 2 times daily   Yes Historical Provider, MD   cephALEXin (KEFLEX) 500 MG capsule Take 500 mg by mouth 4 times daily   Yes Historical Provider, MD   Loratadine 10 MG CAPS Take 10 mg by mouth. Historical Provider, MD   omeprazole (PRILOSEC) 20 MG capsule Take 20 mg by mouth daily. Historical Provider, MD   tamsulosin (FLOMAX) 0.4 MG capsule Take 0.4 mg by mouth daily. Historical Provider, MD   ibuprofen (ADVIL;MOTRIN) 600 MG tablet Take 600 mg by mouth every 6 hours as needed for Pain. Historical Provider, MD   promethazine (PHENERGAN) 25 MG/ML injection Inject 6.25 mg into the muscle every 6 hours as needed. Historical Provider, MD   acetaminophen (TYLENOL) 325 MG tablet Take 650 mg by mouth every 6 hours as needed for Pain.     Historical Provider, MD       Current medications:    Current Facility-Administered Medications   Medication Dose Route Frequency Provider Last Rate Last Admin    famotidine (PEPCID) injection 20 mg  20 mg IntraVENous Once Steven Rodriguez MD        lactated ringers infusion   IntraVENous Continuous Steven Rodriguez MD        sodium chloride flush 0.9 % injection 10 mL  10 mL IntraVENous 2 times per day Steven Rodriguez MD        sodium chloride flush 0.9 % injection 10 mL  10 mL IntraVENous PRN Steven Rodriguez MD        0.9 % sodium chloride infusion  25 mL IntraVENous PRN Steven Rodriguez MD  ceFAZolin (ANCEF) 2000 mg in dextrose 5 % 100 mL IVPB  2,000 mg IntraVENous On Call to Jade Nolan MD        acetaminophen (TYLENOL) tablet 1,000 mg  1,000 mg Oral On Call to Jade Nolan MD           Allergies:  No Known Allergies    Problem List:    Patient Active Problem List   Diagnosis Code    Painful orthopaedic hardware [facility]) T84.84XA       Past Medical History:        Diagnosis Date    Anemia     BPH (benign prostatic hyperplasia)     Dementia ([address]     History of seizure     [place] (hard of hearing)     Hx: UTI (urinary tract infection)     Hypertension     Impaired mobility        Past Surgical History:        Procedure Laterality Date    ANKLE SURGERY Right     BACK SURGERY      HERNIA REPAIR         Social History:    Social History     Tobacco Use    Smoking status: Former Smoker    Smokeless tobacco: Never Used   Substance Use Topics    Alcohol use: Never                                Counseling given: Not Answered      Vital Signs (Current):   Vitals:    03/29/22 1102   Weight: 120 lb 4.8 oz (54.6 kg)   Height: 5' 3\" (1.6 m)                                              BP Readings from Last 3 Encounters:   06/03/14 128/63       NPO Status:  0800 BREAKFAST/MEDS                                                                               BMI:   Wt Readings from Last 3 Encounters:   03/29/22 120 lb 4.8 oz (54.6 kg)   06/03/14 160 lb (72.6 kg)     Body mass index is 21.31 kg/m².     CBC:   Lab Results   Component Value Date    WBC 6.7 10/10/2010    RBC 4.14 10/10/2010    HGB 12.0 10/14/2010    HCT 36.7 10/14/2010    MCV 82.7 10/10/2010    RDW 17.2 10/10/2010     10/10/2010       CMP:   Lab Results   Component Value Date     10/12/2010    K 3.5 10/12/2010     10/12/2010    CO2 34 10/12/2010    BUN 22 10/12/2010    CREATININE 0.7 10/12/2010    GFRAA >60 10/12/2010    GLUCOSE 89 10/12/2010    PROT 8.9 10/10/2010    CALCIUM 8.7 10/12/2010    BILITOT 0.28 10/10/2010    ALKPHOS 84 10/10/2010    AST 19 10/10/2010    ALT 9 10/10/2010       POC Tests: No results for input(s): POCGLU, POCNA, POCK, POCCL, POCBUN, POCHEMO, POCHCT in the last 72 hours. Coags:   Lab Results   Component Value Date    PROTIME 14.4 10/10/2010    PROTIME 14.7 01/08/2010    INR 1.26 10/10/2010    INR 1.38 01/08/2010    APTT 25.5 10/10/2010    APTT 35.3 01/08/2010       HCG (If Applicable): No results found for: PREGTESTUR, PREGSERUM, HCG, HCGQUANT     ABGs: No results found for: PHART, PO2ART, JZT3MKJ, DUC1BXH, BEART, E2AZUFDZ     Type & Screen (If Applicable):  Lab Results   Component Value Date    LABABO O 10/10/2010    LABRH Positive 10/10/2010       Drug/Infectious Status (If Applicable):  No results found for: HIV, HEPCAB    COVID-19 Screening (If Applicable): No results found for: COVID19        Anesthesia Evaluation  Patient summary reviewed no history of anesthetic complications:   Airway: Mallampati: II  TM distance: >3 FB   Neck ROM: full  Mouth opening: > = 3 FB Dental:    (+) upper dentures and lower dentures      Pulmonary:Negative Pulmonary ROS breath sounds clear to auscultation      (-) asthma, shortness of breath, recent URI, sleep apnea and not a current smoker          Patient did not smoke on day of surgery. Cardiovascular:    (+) hypertension (NO MEDS CURRENT):,     (-) pacemaker, past MI, CAD, CABG/stent, dysrhythmias,  angina and  CHF    ECG reviewed  Rhythm: regular  Rate: normal           Beta Blocker:  Not on Beta Blocker         Neuro/Psych:   (+) seizures:, psychiatric history (DEMENTIA):   (-) TIA and CVA           GI/Hepatic/Renal:   (+) renal disease (BPH):,      (-) liver disease, bowel prep and no morbid obesity       Endo/Other:    (+) : arthritis:., no malignancy/cancer. (-) diabetes mellitus, hypothyroidism, hyperthyroidism, blood dyscrasia, no malignancy/cancer               Abdominal:       Abdomen: soft.       Vascular: negative vascular ROS.         Other Findings:           Anesthesia Plan      general     ASA 3       Induction: intravenous. MIPS: Postoperative opioids intended and Prophylactic antiemetics administered. Anesthetic plan and risks discussed with patient and healthcare power of . Plan discussed with CRNA. This pre-anesthesia assessment may be used as a history and physical.    DOS STAFF ADDENDUM:    Pt seen and examined, chart reviewed (including anesthesia, drug and allergy history). No interval changes to history and physical examination. Anesthetic plan, risks, benefits, alternatives, and personnel involved discussed with patient. Patient verbalized an understanding and agrees to proceed.       Howard Rao MD  March 31, 2022  10:51 AM    Howard Rao MD   3/31/2022

## 2022-03-31 NOTE — PROGRESS NOTES
Spoke with Germain Ferguson at [employer] and she confirmed that he ate 75% of his breakfast and finished at 0800am. He had Keppra, multi-vitamin, cephalexin, and claritin.

## 2022-03-31 NOTE — PROGRESS NOTES
D: Bedside report received from [facility], all current drips, treatments, skin issues, skin issues, and plan of care were reviewed by both RN's, patients care transferred with patient in stable condition.

## 2022-04-01 PROBLEM — T84.7XXA HARDWARE COMPLICATING WOUND INFECTION (HCC): Status: ACTIVE | Noted: 2022-01-01

## 2022-04-01 PROBLEM — J81.0 ACUTE PULMONARY EDEMA (HCC): Status: ACTIVE | Noted: 2022-01-01

## 2022-04-01 PROBLEM — Z87.891 FORMER SMOKER: Status: ACTIVE | Noted: 2022-01-01

## 2022-04-01 PROBLEM — J96.01 ACUTE RESPIRATORY FAILURE WITH HYPOXIA (HCC): Status: ACTIVE | Noted: 2022-01-01

## 2022-04-01 PROBLEM — I48.91 NEW ONSET ATRIAL FIBRILLATION (HCC): Status: ACTIVE | Noted: 2022-01-01

## 2022-04-01 NOTE — CARE COORDINATION
CASE MANAGEMENT INITIAL ASSESSMENT      Reviewed chart and completed assessment with patient: Pt  Family present:  Pt's sister Jae Yin   Explained Case Management role/services. Primary contact information: Pt's sister [address] :   Primary Decision Maker: Kev Read - Brother/Sister - [phone]        Admit date/status: 3/31/22   Diagnosis: Pt is LTC at [facility]    Is this a Readmission?: No    Insurance: Medicare   Precert required for SNF: N     3 night stay required: Y     Living arrangements, Adls, care needs, prior to admission: Pt lives at The [address] at home:  Walker__Cane__RTS__ BSC__Shower Chair__  02__ HHN__ CPAP__  BiPap__  Hospital Bed__ W/C___ Other__LTC [facility] ___    Services in the home and/or outpatient, prior to admission: LTC [facility]     Transportation needs:  No preference     Dialysis Facility (if applicable)N/A    · Name:  · Address:  · Dialysis Schedule:  · Phone:  · Fax:    PT/OT recs: Pending     Hospital Exemption Notification (HEN): n/a return     Barriers to discharge: None     Plan/comments: 4/1/22 Pt is LTC at [facility], plan is to return, plan confirmed with POA, Call placed to [facility].      ECOC on chart for MD signature

## 2022-04-01 NOTE — PLAN OF CARE
Problem: Falls - Risk of:  Goal: Will remain free from falls  Description: Will remain free from falls  Outcome: Ongoing  Goal: Absence of physical injury  Description: Absence of physical injury  Outcome: Ongoing     Problem: Infection:  Goal: Will remain free from infection  Description: Will remain free from infection  Outcome: Ongoing     Problem: Safety:  Goal: Free from accidental physical injury  Description: Free from accidental physical injury  Outcome: Ongoing  Goal: Free from intentional harm  Description: Free from intentional harm  Outcome: Ongoing     Problem: Pain:  Goal: Patient's pain/discomfort is manageable  Description: Patient's pain/discomfort is manageable  Outcome: Ongoing     Problem: Skin Integrity:  Goal: Skin integrity will stabilize  Description: Skin integrity will stabilize  Outcome: Ongoing     Problem: Skin Integrity:  Goal: Will show no infection signs and symptoms  Description: Will show no infection signs and symptoms  Outcome: Ongoing  Goal: Absence of new skin breakdown  Description: Absence of new skin breakdown  Outcome: Ongoing

## 2022-04-01 NOTE — CARE COORDINATION
4/1/22 Pt is LTC at [facility] and may return anytime medically ready, no barriers, [facility]) agrees with the plan.

## 2022-04-01 NOTE — PROGRESS NOTES
Called pt's sister José Antonio Bernal and updated her on status and new orders. Pt lying in bed, no distress noted. Denies any pain. Remains on 2L [place] 02 sat 95%. All safety measures in place.

## 2022-04-01 NOTE — PROGRESS NOTES
Physical Therapy    Facility/Department: [facility] - MED SURG/ORTHO  Initial Assessment    NAME: Brian Ibrahim  : 1934  MRN: [id number]    Date of Service: 2022    Discharge Recommendations:  ECF with PT   PT Equipment Recommendations  Equipment Needed: No    Assessment   Body structures, Functions, Activity limitations: Decreased functional mobility ; Decreased ADL status; Decreased ROM; Decreased strength;Decreased cognition;Decreased endurance;Decreased balance  Assessment: Pt is an 81 y/o male who presents s/p R ankle hardware removal. Pt lives in [address] [place] [address] and reports using scooter mostly for mobility, however pt is a poor historian. Pt currently requires mod A for bed mobility and declined out of bed secondary to fatigue and pain. Pt would benefit from continued skilled PT to address current limitations. Anticipate pt will be safe to return to ECF with PT. Treatment Diagnosis: impaired functional mobility  Prognosis: Fair  Decision Making: Medium Complexity  PT Education: Goals;PT Role;General Safety; Disease Specific Education;Gait Training;Precautions  Patient Education: Educated on use of boot and wear schedule - will need reinforcement  REQUIRES PT FOLLOW UP: Yes  Activity Tolerance  Activity Tolerance: Patient Tolerated treatment well;Patient limited by fatigue  Activity Tolerance: /64, HR 89, SpO2 94% on 2L       Patient Diagnosis(es): The primary encounter diagnosis was Painful orthopaedic hardware ([address] A diagnosis of Pain due to any device, implant or graft, initial encounter was also pertinent to this visit. has a past medical history of Anemia, BPH (benign prostatic hyperplasia), Dementia ([address] History of seizure, [place] (hard of hearing), Hx: UTI (urinary tract infection), Hypertension, and Impaired mobility. has a past surgical history that includes back surgery; hernia repair;  Ankle surgery (Right); and Ankle surgery (Right, 3/31/2022). Restrictions  Restrictions/Precautions  Restrictions/Precautions: Fall Risk,Weight Bearing  Required Braces or Orthoses?: Yes  Lower Extremity Weight Bearing Restrictions  Right Lower Extremity Weight Bearing: Weight Bearing As Tolerated  Required Braces or Orthoses  Right Lower Extremity Brace: Boot  RLE Brace Type: Cam boot when ambulating per RN  Vision/Hearing  Hearing: Exceptions to [facility]  Hearing Exceptions: Hard of hearing/hearing concerns     Subjective  General  Chart Reviewed: Yes  Patient assessed for rehabilitation services?: Yes  Family / Caregiver Present: No  Referring Practitioner: Joan Purcell MD  Referral Date : 03/31/22  Diagnosis: ankle pain from foreign body  Follows Commands: Within Functional Limits  General Comment  Comments: Pt supine in bed upon arrival. RN cleared pt for therapy. Subjective  Subjective: Pt agreeable to therapy. Pleasantly confused, poor historian. Pain Screening  Patient Currently in Pain: Other (comment) (reports pain comes and goes in R ankle but no discomfort at time of eval)  Vital Signs  Patient Currently in Pain: Other (comment) (reports pain comes and goes in R ankle but no discomfort at time of eval)     Social/Functional History  Social/Functional History  Type of Home: Facility ([address]  Home Equipment: Electric scooter,Wheelchair-manual  ADL Assistance: Needs assistance  Homemaking Responsibilities: No  Ambulation Assistance: Needs assistance (reports walking in therapy a couple weeks ago about 30 feet)  Transfer Assistance: Independent (reports being able to independently get on/off scooter)  Active : No  Additional Comments: Pt has been at [facility] for 11 years. Reports needing more help in the past 2 weeks and \"getting more therapy\". Unclear if pt was possibly in skilled section recently? Poor historian.     Objective  AROM RLE (degrees)  RLE AROM: Exceptions  R Knee Extension 0: 35*  AROM LLE (degrees)  LLE AROM : WFL  Strength RLE  Strength RLE: Exception  Comment: grossly 3+/5 except ankle NT  Strength LLE  Strength LLE: Exception  Comment: grossly 3+/5  Motor Control  Gross Motor?: WFL  Sensation  Overall Sensation Status: Impaired  Additional Comments: pt reports numbness on RLE but unable to clarify where  Bed mobility  Supine to Sit: Moderate assistance (assist with trunk and RLE)  Sit to Supine: Minimal assistance (assist with RLE)  Scooting: Moderate assistance (toward EOB)  Transfers  Sit to Stand: Unable to assess  Stand to sit: Unable to assess  Bed to Chair: Unable to assess  Comment: pt declining to attempt sit-stand due to fatigue and pain  Ambulation  Ambulation?: No  WB Status: WBAT RLE     Balance  Comments: Sat EOB x10 minutes with SBA      Plan   Plan  Times per week: 4-6x/wk  Current Treatment Recommendations: Strengthening,Balance Training,Functional Mobility Training,Transfer Training,Gait Training,Home Exercise Program,Patient/Caregiver Education & Training  Safety Devices  Type of devices:  All fall risk precautions in place,Call light within reach,Bed alarm in place,Patient at risk for falls,Left in bed,Nurse notified    AM-PAC Score     AM-PAC Inpatient Mobility without Stair Climbing Raw Score : 7 (04/01/22 1449)  AM-PAC Inpatient without Stair Climbing T-Scale Score : 28.66 (04/01/22 1449)  Mobility Inpatient CMS 0-100% Score: 86.29 (04/01/22 1449)  Mobility Inpatient without Stair CMS G-Code Modifier : CM (04/01/22 1449)     Goals  Short term goals  Time Frame for Short term goals: 5 days (4/6/22)  Short term goal 1: Pt will perform bed mobility with SBA  Short term goal 2: Pt will perform transfers with mod A  Short term goal 3: By 4/3/22, pt will tolerate 10 reps of LE ther ex for improved strength and activity tolerance  Patient Goals   Patient goals : \"to get on my scooter\"     Therapy Time   Individual Concurrent Group Co-treatment   Time In 8859         Time Out 1354         Minutes 49 Timed Code Treatment Minutes: [address] [facility] [address],   [facility]

## 2022-04-01 NOTE — PLAN OF CARE
Patient is POD #1 from right ankle hardware removal.     Called and talked to nurse, Pedro Harrison, who reports patient is doing well overall. He is having trouble weaning of 02 (is currently on 2L), but pain is well controlled. Has not yet worked with PT. Patient is ok to [facility] but should wear boot for ambulation/weightbearing. Can have boot off in bed. Preliminary lab results (cultures from surgery) show no growth to date. Patient has received IV abx (Ancef) and plan would be to continue po abx (Kelfex) upon discharge. With patient's difficulty weaning from O2, hospitalist consult will be placed for further evaluation and treatment with hope for discharge back to SNF later today or tomorrow.      Zach Dhillon PA-C

## 2022-04-01 NOTE — CONSULTS
Hospital Medicine  Consult History & Physical        Chief Complaint: Postoperative hypoxia    Date of Service: Pt seen/examined in consultation on 4/1/2022    History Of Present Illness:      80 y.o. male who we are asked to see/evaluate by Wei Slater MD for medical management of acute hypoxic respiratory failure ongoing since patient underwent orthopedic surgery on 3/31/2022 to remove right ankle hardware and debridement surrounding tissue due to concerns of infection and osteomyelitis. The patient himself is without complaints, however he is noted for with SPO2 as low as 88% on RA. Patient has an underlying history of dementia therefore HPI is limited. Review of epic chart reveals that patient is a former tobacco smoker. Past Medical History:        Diagnosis Date    Anemia     BPH (benign prostatic hyperplasia)     Dementia (HCC)     History of seizure     [place] (hard of hearing)     Hx: UTI (urinary tract infection)     Hypertension     Impaired mobility        Past Surgical History:        Procedure Laterality Date    ANKLE SURGERY Right     ANKLE SURGERY Right 3/31/2022    RIGHT ANKLE HARDWARE REMOVAL performed by Wei Slater MD at [address] [place] [address]         Medications Prior to Admission:    Prior to Admission medications    Medication Sig Start Date End Date Taking? Authorizing Provider   oxyCODONE-acetaminophen (PERCOCET) 5-325 MG per tablet Take 1 tablet by mouth every 6 hours as needed for Pain for up to 7 days. Intended supply: 7 days. Take lowest dose possible to manage pain 3/31/22 4/7/22 Yes William Tristan PA-C   levETIRAcetam (KEPPRA) 500 MG tablet Take 500 mg by mouth 2 times daily   Yes Historical Provider, MD   cephALEXin (KEFLEX) 500 MG capsule Take 500 mg by mouth 4 times daily   Yes Historical Provider, MD   Loratadine 10 MG CAPS Take 10 mg by mouth.     Historical Provider, MD   omeprazole (PRILOSEC) 20 MG capsule Take 20 mg by mouth daily.    Historical Provider, MD   tamsulosin (FLOMAX) 0.4 MG capsule Take 0.4 mg by mouth daily. Historical Provider, MD   ibuprofen (ADVIL;MOTRIN) 600 MG tablet Take 600 mg by mouth every 6 hours as needed for Pain. Historical Provider, MD   promethazine (PHENERGAN) 25 MG/ML injection Inject 6.25 mg into the muscle every 6 hours as needed. Historical Provider, MD   acetaminophen (TYLENOL) 325 MG tablet Take 650 mg by mouth every 6 hours as needed for Pain. Historical Provider, MD       Allergies:  Patient has no known allergies. Social History:       TOBACCO:   reports that he has quit smoking. He has never used smokeless tobacco.  ETOH:   reports no history of alcohol use. Family History:     Reviewed in detail and negative for DM, Cancer, CVA. Positive as follows:        Problem Relation Age of Onset    Heart Attack Father        REVIEW OF SYSTEMS COMPLETED:   Pertinent positives as noted in the HPI. All other systems reviewed and negative. PHYSICAL EXAM PERFORMED:  /62   Pulse 80   Temp 98 °F (36.7 °C) (Oral)   Resp 14   Ht 5' 3\" (1.6 m)   Wt 120 lb 4.8 oz (54.6 kg)   SpO2 97%   BMI 21.31 kg/m²   General appearance: No apparent distress, appears stated age and cooperative. HEENT: Normal cephalic, atraumatic without obvious deformity. Pupils equal, round, and reactive to light. Extra ocular muscles intact; [place] Conjunctivae/corneas clear. Neck: Supple, with full range of motion. No jugular venous distention. Trachea midline. Respiratory:  No tachypnea; barrel chested; faint bibasilar crackles  Cardiovascular: Irregularly irregular rhythm; no edema  Abdomen: Soft, non-tender, non-distended with normal bowel sounds. Musculoskeletal: RLE with postop splint in place, C/D/I  Skin: Skin color, texture, turgor normal.  No rashes or lesions. Neurologic:  Neurovascularly intact without any focal sensory/motor deficits.  Cranial nerves: II-XII intact, grossly non-focal.  Psychiatric: Alert but pleasantly confused  Capillary Refill: Brisk,3 seconds, normal   Peripheral Pulses: +2 palpable, equal bilaterally     Labs:     Recent Labs     03/31/22  1150 04/01/22  0713 04/01/22  1627   WBC 6.2 5.9  --    HGB 12.2* 11.4* 13.0*   HCT 37.6* 36.0* 40.6    198  --      Recent Labs     03/31/22  1150 04/01/22  0714 04/01/22  1627    141 136   K 5.4* 5.0 5.1    106 100   CO2 24 26 22   BUN 21* 17 20   CREATININE 0.7* 0.7* 0.8   CALCIUM 9.0 9.0 9.2   PHOS  --   --  3.2     No results for input(s): AST, ALT, BILIDIR, BILITOT, ALKPHOS in the last 72 hours. No results for input(s): INR in the last 72 hours. Recent Labs     04/01/22  1627 04/01/22  1937   TROPONINI <0.01 <0.01       Urinalysis:    Lab Results   Component Value Date    NITRU Negative 04/01/2022    WBCUA 6-9 04/01/2022    BACTERIA Rare 04/01/2022    RBCUA 0-2 04/01/2022    BLOODU Negative 04/01/2022    SPECGRAV 1.025 04/01/2022    GLUCOSEU Negative 04/01/2022    GLUCOSEU Negative 01/08/2010       Radiology: I have reviewed the radiology reports with the following interpretation:     XR CHEST PORTABLE   Final Result   Diffuse bilateral pulmonary opacities could represent edema or infection                EKG:  I have reviewed the EKG with the following interpretation    Ventricular Rate 88 P BPM QTc Calculation (Bazett) 425 P ms   Atrial Rate 81 P BPM R Axis 46 P degrees   QRS Duration 78 P ms T Axis -13 P degrees   Q-T Interval 352 P ms Diagnosis Atrial fibrillationST & T wave abnormality, consider inferolateral ischemiaAbnormal            ASSESSMENT:    Active Hospital Problems    Diagnosis Date Noted    Acute respiratory failure with hypoxia ([address] [J96.01] 04/01/2022     Priority: High    New onset atrial fibrillation ([address] [I48.91] 04/01/2022     Priority: High    Hardware complicating wound infection ([address] [K39. 7XXA] 04/01/2022     Priority: High    Acute pulmonary edema ([facility] [J81.0] 04/01/2022     Priority: High    Former smoker [C37.572] 04/01/2022    Foreign body of ankle, right, superficial, infected, initial encounter [S90.551A, L08.9] 03/31/2022       PLAN:    Acute hypoxic respiratory failure former smoker  -Continuous pulse oximetry monitoring initiated with PRN supplemental O2   -Encourage aggressive pulmonary toilet including incentive spirometry every 4H while awake  -Patient initiated on Xopenex and Atrovent nebulizer therapy scheduled 4 times daily  -CXR ordered stat to rule out aspiration pneumonia, pulmonary edema, or other cardiopulmonary abnormalities; pulmonary edema noted therefore lasix ordered as documented below  -VBG, lactate, BNP, troponin, and thyroid studies pending    New onset A. fib with pulmonary edema  -Admit to medical floor for continuous telemetry and pulse oximetry monitoring  -Blood cultures, electrolytes, thyroid studies to assess for secondary causes of this tachyarrhythmia  -BNP > 3000, and CXR with edema therefore begin IV Lasix 20 mg twice daily  -Patients CHADS2 score of 2 makes him a candidate for anticoagulation, but this will be deferred to cardiology recommendations in the setting of advanced age and risk for fall   -ECHO scheduled for the a.m. to further assess cardiac structure and function, as well as rule out intramural thrombus  -Consultation placed to CARDS for further evaluation and treatment    S/p removal of right ankle hardware  -Postoperative care as per primary ORTHO team  -Patient received Ancef 2 g preoperatively, but is currently on no antibiotics  -Blood cultures, lactate, and procalcitonin level pending  -Patient's pain is currently being managed with NSAIDs and Tylenol, therefore hypoxia not secondary to opiate administration       DVT Prophylaxis:  twice daily per ORTHO  Diet: ADULT ORAL NUTRITION SUPPLEMENT; PM Snack; Standard High Calorie/High Protein Oral Supplement  ADULT DIET;  Dysphagia - Soft and Bite Sized  Code Status: Prior    PT/OT Eval Status: Active and ongoing    Dispo -unclear at this time    Thank you for the consultation, will follow up as needed    Electronically signed by Huan Dupree MD on 4/1/22 at 4:37 PM EDT

## 2022-04-01 NOTE — PROGRESS NOTES
Chuyita LAUREN called and this nurse updated her with pts 02 demand. Was not able to wean off 02. She said that she will talk with Dr Carmelina Novak and see if he will be discharged today or tomorrow. Ok to have boot off in bed, must wear when up ambulating.

## 2022-04-02 NOTE — CONSULTS
[address] [facility]  [phone]        Reason for Consultation/Chief Complaint: \"I have been asked to see the patient for hypoxia and new onset afib . \"    History of Present Illness:  Piper Alas is a 80 y.o. patient who presented to the hospital with right ankle pain. .   Review of EMR    80 y.o. male who we are asked to see/evaluate by Joan Purcell MD for medical management of acute hypoxic respiratory failure ongoing since patient underwent orthopedic surgery on 3/31/2022 to remove right ankle hardware and debridement surrounding tissue due to concerns of infection and osteomyelitis. The patient himself is without complaints, however he is noted for with SPO2 as low as 88% on RA. Patient has an underlying history of dementia therefore HPI is limited. Review of epic chart reveals that patient is a former tobacco smoker. I have been asked to provide consultation regarding further management and testing. Past Medical History:   has a past medical history of Anemia, BPH (benign prostatic hyperplasia), Dementia ([address] History of seizure, [place] (hard of hearing), Hx: UTI (urinary tract infection), Hypertension, and Impaired mobility. Surgical History:   has a past surgical history that includes back surgery; hernia repair; Ankle surgery (Right); and Ankle surgery (Right, 3/31/2022). Social History:   reports that he has quit smoking. He has never used smokeless tobacco. He reports that he does not drink alcohol and does not use drugs. Family History:  family history includes Heart Attack in his father. Home Medications:  Were reviewed and are listed in nursing record. and/or listed below  Prior to Admission medications    Medication Sig Start Date End Date Taking? Authorizing Provider   oxyCODONE-acetaminophen (PERCOCET) 5-325 MG per tablet Take 1 tablet by mouth every 6 hours as needed for Pain for up to 7 days. Intended supply: 7 days.  Take lowest dose possible to manage pain 3/31/22 4/7/22 Yes Christina García PA-C   levETIRAcetam (KEPPRA) 500 MG tablet Take 500 mg by mouth 2 times daily   Yes Historical Provider, MD   cephALEXin (KEFLEX) 500 MG capsule Take 500 mg by mouth 4 times daily   Yes Historical Provider, MD   Loratadine 10 MG CAPS Take 10 mg by mouth. Historical Provider, MD   omeprazole (PRILOSEC) 20 MG capsule Take 20 mg by mouth daily. Historical Provider, MD   tamsulosin (FLOMAX) 0.4 MG capsule Take 0.4 mg by mouth daily. Historical Provider, MD   ibuprofen (ADVIL;MOTRIN) 600 MG tablet Take 600 mg by mouth every 6 hours as needed for Pain. Historical Provider, MD   promethazine (PHENERGAN) 25 MG/ML injection Inject 6.25 mg into the muscle every 6 hours as needed. Historical Provider, MD   acetaminophen (TYLENOL) 325 MG tablet Take 650 mg by mouth every 6 hours as needed for Pain. Historical Provider, MD        Allergies:  Patient has no known allergies.      Review of Systems:   12 point ROS negative in all areas as listed below except as in [place]  Constitutional, EENT, GI, ,skin, neurological, hematological, endocrine, Psychiatric    Physical Examination:    Vitals:    04/02/22 0430   BP: 107/62   Pulse: 65   Resp: 16   Temp: 98.1 °F (36.7 °C)   SpO2: 95%    Weight: 120 lb 4.8 oz (54.6 kg)         General Appearance:  Alert, cooperative, no distress, appears stated age   Head:  Normocephalic, without obvious abnormality, atraumatic   Eyes:  PERRL, conjunctiva/corneas clear       Nose: Nares normal, no drainage or sinus tenderness   Throat: Lips, mucosa, and tongue normal   Neck: Supple, symmetrical, trachea midline, no adenopathy, thyroid: not enlarged, symmetric, no tenderness/mass/nodules, no carotid bruit or JVD       Lungs:   Crackles  to auscultation bilaterally, respirations unlabored   Chest Wall:  No tenderness or deformity   Heart:  Regular rate and rhythm, S1, S2 normal, 3/6 mitral regurgitation murmur, rub or gallop   Abdomen: Soft, non-tender, bowel sounds active all four quadrants,  no masses, no organomegaly           Extremities: Extremities normal, atraumatic, no cyanosis or edema right ankle in soft cast   Pulses: 2+ and symmetric   Skin: Skin color, texture, turgor normal, no rashes or lesions   Pysch: Normal mood and affect poor memory and poor insight    Neurologic: Normal gross motor and sensory exam.         Labs  CBC:   Lab Results   Component Value Date    WBC 5.9 04/01/2022    RBC 3.97 04/01/2022    HGB 13.0 04/01/2022    HCT 40.6 04/01/2022    MCV 90.5 04/01/2022    RDW 16.5 04/01/2022     04/01/2022     CMP:    Lab Results   Component Value Date     04/01/2022    K 5.1 04/01/2022    K 5.4 03/31/2022     04/01/2022    CO2 22 04/01/2022    BUN 20 04/01/2022    CREATININE 0.8 04/01/2022    GFRAA >60 04/01/2022    GFRAA >60 10/12/2010    LABGLOM >60 04/01/2022    GLUCOSE 106 04/01/2022    PROT 8.9 10/10/2010    CALCIUM 9.2 04/01/2022    BILITOT 0.28 10/10/2010    ALKPHOS 84 10/10/2010    AST 19 10/10/2010    ALT 9 10/10/2010     PT/INR:  No results found for: PTINR  Lab Results   Component Value Date    TROPONINI <0.01 04/02/2022     troponinx3 <0.01  EKG:  I have reviewed EKG with the following interpretation:  Impression:     Atrial fibrillationST & T wave abnormality, consider inferolateral ischemiaAbnormal ECGWhen compared with ECG of 31-MAR-2022 10:26,Atrial fibrillation has replaced Sinus rhythmT wave inversion now evident in Inferior leadsT wave inversion less evident in Lateral leadsConfirmed by Blair Neumann ([id number]) on 4/1/2022 4:44:14 PM Sinus rhythm with occasional Premature ventricular complexesLeft ventricular hypertrophy with repolarization abnormalityAbnormal ECGWhen compared with ECG of 10-OCT-2010 13:22,Premature ventricular complexes are now PresentST less depressed in Anterior leadsConfirmed by Blair Neumann ([id number]) on     Chest xray 4.1.22     FINDINGS:   Cardiomegaly.  Diffuse bilateral pulmonary opacities.  No pneumothorax. Right-sided pleural effusion.  No pulmonary vascular congestion.           Impression   Diffuse bilateral pulmonary opacities could represent edema or infection         Assessment  New onset afib seems to be regular rhythm now he is not being monitored for some reason   Acute pulmonary edema not sure if he has underlying pulmonary fibrosis as he is comfortable lying flat with bilat diffuse coarse crackles  Moderate to Severe mitral regurg and mitral valve prolapse per echo 2010  Patient Active Problem List   Diagnosis    Painful orthopaedic hardware [facility])    Foreign body of ankle, right, superficial, infected, initial encounter    Acute respiratory failure with hypoxia ([address]    Former smoker    New onset atrial fibrillation ([address]    Hardware complicating wound infection ([address]    Acute pulmonary edema ([address]         Plan:    I had the opportunity to review the clinical symptoms and presentation of Lashawn Prado. Will repeat echo  Repeat EKG  Hold off on anticoagulation as he is in regular rhythm  He should  Be discharged on anticoagulants DOAC unless he is a fall risk more discussion needed with family  Thyroid function    I will address the patient's cardiac risk factors and adjusted pharmacologic treatment as needed. In addition, I have reinforced the need for patient directed risk factor modification. Thank you for allowing to us to participate in the care or Lashawn Prado. Further evaluation will be based upon the patient's clinical course and testing results. All questions and concerns were addressed to the patient/family. Alternatives to my treatment were discussed. The note was completed using EMR. Every effort was made to ensure accuracy; however, inadvertent computerized transcription errors may be present.

## 2022-04-02 NOTE — PROGRESS NOTES
Dr. Gary Ugarte called back - she is not the DrKrystin For this pt due to pt seeing Buster Oliver. Called [facility] - was placed on hold and no one came back to the phone. Will call again later.

## 2022-04-02 NOTE — PLAN OF CARE
Problem: Falls - Risk of:  Goal: Will remain free from falls  Description: Will remain free from falls  Outcome: Ongoing     Problem: Infection:  Goal: Will remain free from infection  Description: Will remain free from infection  Outcome: Ongoing     Problem: Safety:  Goal: Free from accidental physical injury  Description: Free from accidental physical injury  Outcome: Ongoing

## 2022-04-02 NOTE — PROGRESS NOTES
Called [facility] and was able to get a call out to Dr. Juice Whaley him of MRSA in the right ankle sample that was sent to the lab.

## 2022-04-02 NOTE — PROGRESS NOTES
Hospitalist Progress Note      PCP: Unknown Provider Result (Inactive)    Date of Admission: 3/31/2022      Chief Complaint: Postoperative hypoxia    Hospital Course: reviewed     Subjective: resting in bed, no pain, no sob/cp, no overnight events , on minimal oxygen      Medications:  Reviewed    Infusion Medications    sodium chloride       Scheduled Medications    ipratropium  0.5 mg Nebulization Q8H    levalbuterol  0.63 mg Nebulization Q8H    furosemide  20 mg IntraVENous BID    cetirizine  5 mg Oral Daily    pantoprazole  40 mg Oral QAM AC    tamsulosin  0.4 mg Oral Daily    levETIRAcetam  500 mg Oral BID    sodium chloride flush  10 mL IntraVENous 2 times per day    aspirin  325 mg Oral BID     PRN Meds: perflutren lipid microspheres, ibuprofen, acetaminophen, sodium chloride flush, sodium chloride      Intake/Output Summary (Last 24 hours) at 4/2/2022 0833  Last data filed at 4/2/2022 0438  Gross per 24 hour   Intake 120 ml   Output 1750 ml   Net -1630 ml       Physical Exam Performed:    /62   Pulse 65   Temp 98.1 °F (36.7 °C) (Oral)   Resp 16   Ht 5' 3\" (1.6 m)   Wt 120 lb 4.8 oz (54.6 kg)   SpO2 95%   BMI 21.31 kg/m²     General appearance: No apparent distress, appears stated age and cooperative. HEENT: Normal cephalic, atraumatic without obvious deformity. Pupils equal, round, and reactive to light. Extra ocular muscles intact; [place] Conjunctivae/corneas clear. Neck: Supple, with full range of motion. No jugular venous distention. Trachea midline. Respiratory:  No tachypnea; barrel chested; faint bibasilar crackles  Cardiovascular: Irregularly irregular rhythm; no edema  Abdomen: Soft, non-tender, non-distended with normal bowel sounds. Musculoskeletal: RLE with postop splint in place, C/D/I  Skin: Skin color, texture, turgor normal.  No rashes or lesions. Neurologic:  Neurovascularly intact without any focal sensory/motor deficits.  Cranial nerves: II-XII intact, grossly non-focal.  Psychiatric: Alert but pleasantly confused  Capillary Refill: Brisk,3 seconds, normal   Peripheral Pulses: +2 palpable, equal bilaterally       Labs:   Recent Labs     03/31/22  1150 04/01/22  0713 04/01/22  1627   WBC 6.2 5.9  --    HGB 12.2* 11.4* 13.0*   HCT 37.6* 36.0* 40.6    198  --      Recent Labs     03/31/22  1150 04/01/22  0714 04/01/22  1627    141 136   K 5.4* 5.0 5.1    106 100   CO2 24 26 22   BUN 21* 17 20   CREATININE 0.7* 0.7* 0.8   CALCIUM 9.0 9.0 9.2   PHOS  --   --  3.2     No results for input(s): AST, ALT, BILIDIR, BILITOT, ALKPHOS in the last 72 hours. No results for input(s): INR in the last 72 hours. Recent Labs     04/01/22  1937 04/01/22  2320 04/02/22  0248   TROPONINI <0.01 <0.01 <0.01       Urinalysis:      Lab Results   Component Value Date    NITRU Negative 04/01/2022    WBCUA 6-9 04/01/2022    BACTERIA Rare 04/01/2022    RBCUA 0-2 04/01/2022    BLOODU Negative 04/01/2022    SPECGRAV 1.025 04/01/2022    GLUCOSEU Negative 04/01/2022    GLUCOSEU Negative 01/08/2010       Radiology:  XR CHEST PORTABLE   Final Result   Diffuse bilateral pulmonary opacities could represent edema or infection                 Assessment/Plan:    Active Hospital Problems    Diagnosis     Acute respiratory failure with hypoxia ([facility] [J96.01]     Former smoker [L43.968]     New onset atrial fibrillation ([facility] [I48.91]     Hardware complicating wound infection ([facility] [facility] Gill Clifton. 7XXA]     Acute pulmonary edema ([facility] [J81.0]     Foreign body of ankle, right, superficial, infected, initial encounter [S90.551A, L08.9]          Transient postop hypoxia-, likely from combination of anesthesia/sedation/atelectasis, pt is also a former smoker  -Continuous pulse oximetry monitoring initiated with PRN supplemental O2   -Encourage aggressive pulmonary toilet including incentive spirometry every 4H while awake  -Patient initiated on Xopenex and Atrovent nebulizer therapy scheduled 4 times daily  -CXR ordered stat to rule out aspiration pneumonia, pulmonary edema, or other cardiopulmonary abnormalities; pulmonary edema noted therefore lasix ordered as documented below  -VBG, lactate, BNP, troponin, and thyroid studies pending   -procal slightly elevated but do not feel any pulm infection at this time    New onset A. fib with pulmonary edema  -Admit to medical floor for continuous telemetry and pulse oximetry monitoring  -Blood cultures, electrolytes, thyroid studies to assess for secondary causes of this tachyarrhythmia  -BNP > 3000, and CXR with edema therefore begin IV Lasix 20 mg twice daily  -Patients CHADS2 score of 2 makes him a candidate for anticoagulation, but this will be deferred to cardiology recommendations in the setting of advanced age and risk for fall   -ECHO scheduled for the a.m. to further assess cardiac structure and function, as well as rule out intramural thrombus  -Consultation placed to CARDS for further evaluation and treatment     S/p removal of right ankle hardware  -Postoperative care as per primary ORTHO team  -Patient received Ancef 2 g preoperatively, but is currently on no antibiotics  -Blood cultures, lactate, and procalcitonin level pending  -Patient's pain is currently being managed with NSAIDs and Tylenol, therefore hypoxia not secondary to opiate administration        DVT Prophylaxis:  twice daily per ORTHO  Diet: ADULT ORAL NUTRITION SUPPLEMENT; PM Snack; Standard High Calorie/High Protein Oral Supplement  ADULT DIET;  Dysphagia - Soft and Bite Sized  Code Status: Prior    PT/OT Eval Status: per primary    Dispo - wean oxygen as tolerated, pending echo, cards Vibha Sheffield MD

## 2022-04-03 NOTE — PROGRESS NOTES
Department of Orthopedic Surgery  Attending   Progress Note        Subjective:  No complaints. Postoperative course complicated by run of Vtach and work up. pain is perceived as mild (1-3  pain scale)    Vitals  VITALS:  /67   Pulse 67   Temp 97.5 °F (36.4 °C) (Oral)   Resp 18   Ht 5' 3\" (1.6 m)   Wt 120 lb 4.8 oz (54.6 kg)   SpO2 95%   BMI 21.31 kg/m²     PHYSICAL EXAM:    Orientation:  disoriented    Right Lower Extremity    Incision:  intact. clean, dry, intact, dressing removed, dry sanguinous drainage  Erythema from cellulitis tracking proximally much improved  Lower Extremity Motor:  Wiggles toes reflexivly  Vascular:  Good Distal pulses in lower extremity    Boot: Intact as per post operative instructions at bedside  HgB:    Lab Results   Component Value Date    HGB 11.4 04/03/2022     CBC:   Lab Results   Component Value Date    WBC 5.0 04/03/2022    RBC 3.96 04/03/2022    HGB 11.4 04/03/2022    HCT 35.1 04/03/2022    MCV 88.8 04/03/2022    MCH 28.9 04/03/2022    MCHC 32.6 04/03/2022    RDW 16.4 04/03/2022     04/03/2022    MPV 9.3 04/03/2022       ASSESSMENT AND PLAN:    Post operative day 3 status post removal exposed and protruding hardware and debridement skin and bone. Pt unknown to me prior who came from long term care facility and presented with cellulitis and exposed hardware of unknown duration    Cultures of Bone from lateral malleolus positive for light growth MRSA    1:  Continue PT/OT - WBAT in Boot  2: Incision; keep clean and dry.   Please dress and apply boot   3:  Discharge Plan: long term care facility  4:  Medications:  antibiotics per ID Recs  5:  Please call PRRENY Collins MD  [facility]  [phone] (cell)

## 2022-04-03 NOTE — PROGRESS NOTES
04/02/22 1493   RT Protocol   History Pulmonary Disease 0   Respiratory pattern 0   Breath sounds 0   Cough 0   Indications for Bronchodilator Therapy None   Bronchodilator Assessment Score 0 Yes

## 2022-04-03 NOTE — PLAN OF CARE
Problem: Falls - Risk of:  Goal: Will remain free from falls  Description: Will remain free from falls  Outcome: Ongoing     Problem: Safety:  Goal: Free from accidental physical injury  Description: Free from accidental physical injury  Outcome: Ongoing     Problem: Pain:  Goal: Patient's pain/discomfort is manageable  Description: Patient's pain/discomfort is manageable  Outcome: Ongoing

## 2022-04-03 NOTE — PROGRESS NOTES
Hospitalist Progress Note      PCP: Unknown Provider Result (Inactive)    Date of Admission: 3/31/2022      Chief Complaint: Postoperative hypoxia     Hospital Course: reviewed     Subjective: apparently had VT episode(25 beats) overnight(?asympto) , pt resting in bed and w/o complaints      Medications:  Reviewed    Infusion Medications    sodium chloride       Scheduled Medications    furosemide  20 mg IntraVENous BID    cetirizine  5 mg Oral Daily    pantoprazole  40 mg Oral QAM AC    tamsulosin  0.4 mg Oral Daily    levETIRAcetam  500 mg Oral BID    sodium chloride flush  10 mL IntraVENous 2 times per day    aspirin  325 mg Oral BID     PRN Meds: ipratropium, levalbuterol, perflutren lipid microspheres, ibuprofen, acetaminophen, sodium chloride flush, sodium chloride      Intake/Output Summary (Last 24 hours) at 4/3/2022 1010  Last data filed at 4/3/2022 0405  Gross per 24 hour   Intake 240 ml   Output 1800 ml   Net -1560 ml       Physical Exam Performed:    /67   Pulse 67   Temp 97.5 °F (36.4 °C) (Oral)   Resp 18   Ht 5' 3\" (1.6 m)   Wt 120 lb 4.8 oz (54.6 kg)   SpO2 95%   BMI 21.31 kg/m²          General appearance: No apparent distress, appears stated age and cooperative. HEENT: Normal cephalic, atraumatic without obvious deformity. Pupils equal, round, and reactive to light.  Extra ocular muscles intact; [place] Conjunctivae/corneas clear. Neck: Supple, with full range of motion. No jugular venous distention. Trachea midline. Respiratory:  No tachypnea; barrel chested; faint bibasilar crackles  Cardiovascular: Irregularly irregular rhythm; no edema  Abdomen: Soft, non-tender, non-distended with normal bowel sounds. Musculoskeletal: RLE with postop splint in place, C/D/I  Skin: Skin color, texture, turgor normal.  No rashes or lesions. Neurologic:  Neurovascularly intact without any focal sensory/motor deficits.  Cranial nerves: II-XII intact, grossly non-focal.  Psychiatric: Alert but pleasantly confused  Capillary Refill: Brisk,3 seconds, normal   Peripheral Pulses: +2 palpable, equal bilaterally             Labs:   Recent Labs     03/31/22  1150 03/31/22  1150 04/01/22  0713 04/01/22  1627 04/03/22  0711   WBC 6.2  --  5.9  --  5.0   HGB 12.2*   < > 11.4* 13.0* 11.4*   HCT 37.6*   < > 36.0* 40.6 35.1*     --  198  --  216    < > = values in this interval not displayed. Recent Labs     04/01/22  0714 04/01/22  1627 04/03/22  0711    136 137   K 5.0 5.1 4.1    100 101   CO2 26 22 26   BUN 17 20 27*   CREATININE 0.7* 0.8 0.8   CALCIUM 9.0 9.2 8.8   PHOS  --  3.2 3.3     No results for input(s): AST, ALT, BILIDIR, BILITOT, ALKPHOS in the last 72 hours. No results for input(s): INR in the last 72 hours. Recent Labs     04/01/22  1937 04/01/22  2320 04/02/22  0248   TROPONINI <0.01 <0.01 <0.01       Urinalysis:      Lab Results   Component Value Date    NITRU Negative 04/01/2022    WBCUA 6-9 04/01/2022    BACTERIA Rare 04/01/2022    RBCUA 0-2 04/01/2022    BLOODU Negative 04/01/2022    SPECGRAV 1.025 04/01/2022    GLUCOSEU Negative 04/01/2022    GLUCOSEU Negative 01/08/2010       Radiology:  XR CHEST PORTABLE   Final Result   Diffuse bilateral pulmonary opacities could represent edema or infection                 Assessment/Plan:    Active Hospital Problems    Diagnosis     Paroxysmal atrial fibrillation (HCC) [I48.0]     Nonrheumatic mitral valve regurgitation [I34.0]     Mitral valve prolapse [I34.1]     Acute respiratory failure with hypoxia ([address] [J96.01]     Former smoker [Z87.891]     New onset atrial fibrillation ([address] [I48.91]     Hardware complicating wound infection ([address] Stew Bones. 7XXA]     Acute pulmonary edema ([facility] [J81.0]     Foreign body of ankle, right, superficial, infected, initial encounter [S90.551A, L08.9]          Transient postop hypoxia-, likely from combination of anesthesia/sedation/atelectasis, pt is also a former smoker  -Continuous pulse oximetry monitoring initiated with PRN supplemental O2   -Encourage aggressive pulmonary toilet including incentive spirometry every 4H while awake  -Patient initiated on Xopenex and Atrovent nebulizer therapy scheduled 4 times daily  -CXR ordered stat to rule out aspiration pneumonia, pulmonary edema, or other cardiopulmonary abnormalities; pulmonary edema noted therefore lasix ordered as documented below  -VBG, lactate, BNP, troponin, and thyroid studies pending   -procal slightly elevated but do not feel any pulm infection at this time     New onset A. fib with pulmonary edema  -Admit to medical floor for continuous telemetry and pulse oximetry monitoring  -Blood cultures, electrolytes, thyroid studies to assess for secondary causes of this tachyarrhythmia  -BNP > 3000, and CXR with edema therefore begin IV Lasix 20 mg twice daily  -Patients CHADS2 score of 2 makes him a candidate for anticoagulation, but this will be deferred to cardiology recommendations in the setting of advanced age and risk for fall   -ECHO pending  -CARDS consulted  -noted VTach burst on 4/3 am, kept K/mag at 4 and 2 respectively     S/p removal of right ankle hardware  -Postoperative care as per primary ORTHO team  -Patient received Ancef 2 g preoperatively, but is currently on no antibiotics  -Blood cultures on 4/1 ngtd  - lactate wnl, procalcitonin slightly bumped  -Patient's pain is currently being managed with NSAIDs and Tylenol, therefore hypoxia not secondary to opiate administration   -surg cx noted mrsa  -ID consulted 4/2 given concerns for OM     DVT Prophylaxis:  twice daily per ORTHO  Diet: ADULT ORAL NUTRITION SUPPLEMENT; PM Snack; Standard High Calorie/High Protein Oral Supplement  ADULT DIET;  Dysphagia - Soft and Bite Sized  Code Status: Prior       PT/OT Eval Status: per primary     Dispo - wean oxygen as tolerated, pending echo, check/replace mag, cards Margo Suárez MD

## 2022-04-03 NOTE — PROGRESS NOTES
[facility] Daily Progress Note      Admit Date:  3/31/2022    Subjective:  Mr. Dm Robledo is seen for new onset afib          Reason for Consultation/Chief Complaint: \"I have been asked to see the patient for hypoxia and new onset afib . \"  Sarah Silva has no complaints  He is on O2 1 L/min  History of Present Illness:  Sarah Saunders is a 80 y.o. patient who presented to the hospital with right ankle pain. .   Review of EMR     80 y. o. male who we are asked to see/evaluate by Escobar Barry MD for medical management of acute hypoxic respiratory failure ongoing since patient underwent orthopedic surgery on 3/31/2022 to remove right ankle hardware and debridement surrounding tissue due to concerns of infection and osteomyelitis.  The patient himself is without complaints, however he is noted for with SPO2 as low as 88% on RA.  Patient has an underlying history of dementia therefore HPI is limited.  Review of epic chart reveals that patient is a former tobacco smoker.    I have been asked to provide consultation regarding further management and testing.     ROS:  12 point ROS negative in all areas as listed below except as in [place]  Constitutional, EENT,   GI, ,skin, neurological, hematological, endocrine, Psychiatric    Past Medical History:   Diagnosis Date    Anemia     BPH (benign prostatic hyperplasia)     Dementia ([address]     History of seizure     [place] (hard of hearing)     Hx: UTI (urinary tract infection)     Hypertension     Impaired mobility      Past Surgical History:   Procedure Laterality Date    ANKLE SURGERY Right     ANKLE SURGERY Right 3/31/2022    RIGHT ANKLE HARDWARE REMOVAL performed by Ibis Espinosa MD at [address]         Objective:   /67   Pulse 67   Temp 97.5 °F (36.4 °C) (Oral)   Resp 18   Ht 5' 3\" (1.6 m)   Wt 120 lb 4.8 oz (54.6 kg)   SpO2 95%   BMI 21.31 kg/m²       Intake/Output Summary (Last 24 hours) at 4/3/2022 1236  Last data filed at 4/3/2022 0405  Gross per 24 hour   Intake 240 ml   Output 1800 ml   Net -1560 ml       TELEMETRY: NSR    Physical Exam:  General: No Respiratory distress, appears well developed and well nourished. Eyes:  Sclera nonicteric  Nose/Sinuses:  negative findings: nose shows no deformity, asymmetry, or inflammation, nasal mucosa normal, septum midline with no perforation or bleeding  Back:  no pain to palpation  Joint:  no active joint inflammation  Musculoskeletal:  negative  Skin:  Warm and dry  Neck:  Negative for JVD and Carotid Bruits. Chest:  Coarse crackles  to auscultation, respiration easy  Cardiovascular:  RRR, S1S2 normal, 3/6 mitral regurg murmur, no rub or thrill. Abdomen:  Soft normal liver and spleen  Extremities:   No edema, clubbing, cyanosis,  Neuro: intact    Medications:    polyethylene glycol  17 g Oral Once    furosemide  20 mg IntraVENous BID    cetirizine  5 mg Oral Daily    pantoprazole  40 mg Oral QAM AC    tamsulosin  0.4 mg Oral Daily    levETIRAcetam  500 mg Oral BID    sodium chloride flush  10 mL IntraVENous 2 times per day    aspirin  325 mg Oral BID      sodium chloride       ipratropium, levalbuterol, perflutren lipid microspheres, ibuprofen, acetaminophen, sodium chloride flush, sodium chloride    Lab Data:  CBC:   Recent Labs     04/01/22  0713 04/01/22  1627 04/03/22  0711   WBC 5.9  --  5.0   HGB 11.4* 13.0* 11.4*   HCT 36.0* 40.6 35.1*   MCV 90.5  --  88.8     --  216     BMP:   Recent Labs     04/01/22  0714 04/01/22  1627 04/03/22  0711    136 137   K 5.0 5.1 4.1    100 101   CO2 26 22 26   PHOS  --  3.2 3.3   BUN 17 20 27*   CREATININE 0.7* 0.8 0.8     LIVER PROFILE: No results for input(s): AST, ALT, LIPASE, BILIDIR, BILITOT, ALKPHOS in the last 72 hours. Invalid input(s): AMYLASE,  ALB  PT/INR: No results for input(s): PROTIME, INR in the last 72 hours. APTT: No results for input(s): APTT in the last 72 hours.   BNP:  No results for input(s): BNP in the last 72 hours. IMAGING:    TROPONINI <0.01 04/02/2022    EKG 4.2.22  Sinus rhythm with Premature supraventricular complexesLeft ventricular hypertrophy with repolarization abnormalityAbnormal ECGWhen compared with ECG of 01-APR-2022 15:55,Sinus rhythm has replaced Atrial fibrillationT wave inversion no longer evident in Inferior leadsT wave inversion no longer evident in Lateral leadsConfirmed by Cherelle Olmstead MD, [address] ([id number]) on 4/2/2022 5:05:09   troponinx3 <0.01  EKG:  I have reviewed EKG with the following interpretation:  Impression:      Atrial fibrillationST & T wave abnormality, consider inferolateral ischemiaAbnormal ECGWhen compared with ECG of 31-MAR-2022 10:26,Atrial fibrillation has replaced Sinus rhythmT wave inversion now evident in Inferior leadsT wave inversion less evident in Lateral leadsConfirmed by Blair Neumann ([id number]) on 4/1/2022 4:44:14 PM Sinus rhythm with occasional Premature ventricular complexesLeft ventricular hypertrophy with repolarization abnormalityAbnormal ECGWhen compared with ECG of 10-OCT-2010 13:22,Premature ventricular complexes are now PresentST less depressed in Anterior leadsConfirmed by Blair Neumann ([id number]) on      Chest xray 4.1.22      FINDINGS:   Cardiomegaly.  Diffuse bilateral pulmonary opacities.  No pneumothorax. Right-sided pleural effusion.  No pulmonary vascular congestion.           Impression   Diffuse bilateral pulmonary opacities could represent edema or infection            Assessment  New onset afib seems to be regular rhythm resolved   Acute pulmonary edema not sure if he has underlying pulmonary fibrosis as he is comfortable lying flat with bilat diffuse coarse crackles. These changes were not present in 2010.   Moderate to Severe mitral regurg and mitral valve prolapse per echo 2010  Patient Active Problem List   Diagnosis    Painful orthopaedic hardware [facility])    Foreign body of ankle, right, superficial, infected, initial encounter    Acute respiratory failure with hypoxia ([address]    Former smoker    New onset atrial fibrillation ([address]    Hardware complicating wound infection ([address]    Acute pulmonary edema ([address]          Plan:     I had the opportunity to review the clinical symptoms and presentation of Sherrod Lefort.      Will repeat echo  Repeat EKG sinus T wave abnormality has resolved  Hold off on anticoagulation as he is in regular rhythm  Consider anticoagulants on discharge.   Thyroid function normal         Patient Active Problem List    Diagnosis Date Noted    Paroxysmal atrial fibrillation (HCC)     Nonrheumatic mitral valve regurgitation     Mitral valve prolapse     Acute respiratory failure with hypoxia ([address] 04/01/2022    Former smoker 04/01/2022    New onset atrial fibrillation ([address] 04/01/2022    Hardware complicating wound infection ([address] 04/01/2022    Acute pulmonary edema ([address] 04/01/2022    Foreign body of ankle, right, superficial, infected, initial encounter 03/31/2022    Painful orthopaedic hardware ([address] 06/03/2014     ·     Cricket Gurrola MD, MD 4/3/2022 12:36 PM

## 2022-04-04 NOTE — CONSULTS
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 Peraza Mentor       Date of Admission: 3/31/2022  Subjective:   CHIEF COMPLAINT:   None given       HPI:    Otis Nicolas is an 80yoM with history of dementia, BPH, HTN, history of seizures. He is non-ambulatory. Former smoker. He does not contribute to the history. Very remote history of ORIF R ankle. He was noted to have  exposed hardware, pain in the ankle and was referred to Ortho for evaluation. Some cellulitis was noted. He was admitted for operative debridement down to bone and hardware removal on 3/31/22. Surgical culture is positive for MRSA  BC are negative to date   WBC is wnl   He is afebrile on 1L NC    Post-op course notable for transient hypoxemia and new afib. Current abx:  None        Past Surgical History:       Diagnosis Date    Anemia     BPH (benign prostatic hyperplasia)     Dementia (HCC)     History of seizure     [place] (hard of hearing)     Hx: UTI (urinary tract infection)     Hypertension     Impaired mobility          Procedure Laterality Date    ANKLE SURGERY Right     ANKLE SURGERY Right 3/31/2022    RIGHT ANKLE HARDWARE REMOVAL performed by Cassandra Diggs MD at [address] [place] [address]         Social History:    TOBACCO:   reports that he has quit smoking. He has never used smokeless tobacco.  ETOH:   reports no history of alcohol use. There is no history of illicit drug use or other significant epidemiologic exposures.     Family History:       Problem Relation Age of Onset    Heart Attack Father        Current Medications:    Current Facility-Administered Medications: ipratropium (ATROVENT) 0.02 % nebulizer solution 0.5 mg, 0.5 mg, Nebulization, Q4H PRN  levalbuterol (XOPENEX) nebulizer solution 0.63 mg, 0.63 mg, Nebulization, Q4H PRN  perflutren lipid microspheres (DEFINITY) injection 1.65 mg, 1.5 mL, IntraVENous, ONCE PRN  furosemide (LASIX) injection 20 mg, 20 mg, IntraVENous, BID  cetirizine (ZYRTEC) tablet 5 mg, 5 mg, Oral, Daily  pantoprazole (PROTONIX) tablet 40 mg, 40 mg, Oral, QAM AC  tamsulosin (FLOMAX) capsule 0.4 mg, 0.4 mg, Oral, Daily  ibuprofen (ADVIL;MOTRIN) tablet 600 mg, 600 mg, Oral, Q6H PRN  acetaminophen (TYLENOL) tablet 650 mg, 650 mg, Oral, Q6H PRN  levETIRAcetam (KEPPRA) tablet 500 mg, 500 mg, Oral, BID  sodium chloride flush 0.9 % injection 10 mL, 10 mL, IntraVENous, 2 times per day  sodium chloride flush 0.9 % injection 10 mL, 10 mL, IntraVENous, PRN  0.9 % sodium chloride infusion, 25 mL, IntraVENous, PRN  aspirin EC tablet 325 mg, 325 mg, Oral, BID      No Known Allergies       REVIEW OF SYSTEMS:    Unable to obtain     Objective:   PHYSICAL EXAM:      VITALS:  /68   Pulse 67   Temp 97.8 °F (36.6 °C) (Oral)   Resp 17   Ht 5' 3\" (1.6 m)   Wt 120 lb 4.8 oz (54.6 kg)   SpO2 96%   BMI 21.31 kg/m²      24HR INTAKE/OUTPUT:      Intake/Output Summary (Last 24 hours) at 4/4/2022 1019  Last data filed at 4/4/2022 6115  Gross per 24 hour   Intake 240 ml   Output 1550 ml   Net -1310 ml     CONSTITUTIONAL:   Cachectic elderly male  Cooperative with exam   Disoriented   NAD   HEENT: NCAT, PERRL, EOMI. Sclera white, conjunctiva full. OP with moist mucosal membranes, no thrush, tongue protrudes midline  NECK:  Supple, symmetrical, trachea midline, no adenopathy  LUNGS:  no increased work of breathing  ABDOMEN:  normal bowel sounds, soft, flat, NT   PSYCHIATRIC:  No obvious depression or anxiety. MUSCULOSKELETAL:   Medial and lateral ankle surgical sites well approximated with periwound erythema medial > lateral, see photos   Full thickness pressure related wound lateral R foot, see photo  SKIN:  normal skin color, texture, turgor and no redness, warmth, or swelling.  No palpable nodules or stigmata of embolic phenomenon  NEUROLOGIC: nonfocal exam  ACCESS:   PIV site ok DATA:    Old records have been reviewed    CBC:  Recent Labs     04/01/22  1627 04/03/22  0711   WBC  --  5.0   RBC  --  3.96*   HGB 13.0* 11.4*   HCT 40.6 35.1*   PLT  --  216   MCV  --  88.8   MCH  --  28.9   MCHC  --  32.6   RDW  --  16.4*      BMP:  Recent Labs     04/01/22  1627 04/03/22  0711    137   K 5.1 4.1    101   CO2 22 26   BUN 20 27*   CREATININE 0.8 0.8   CALCIUM 9.2 8.8   GLUCOSE 106* 90        Cultures:   3/31 Surgical culture Staph aureus mrsa   Antibiotic Interpretation Microscan  Method Status    ceFAZolin Resistant >16 mcg/mL BACTERIAL SUSCEPTIBILITY PANEL BY BARNEY     clindamycin Resistant >4 mcg/mL BACTERIAL SUSCEPTIBILITY PANEL BY BARNEY     DAPTOmycin Sensitive <=0.5 mcg/mL BACTERIAL SUSCEPTIBILITY PANEL BY BARNEY     erythromycin Resistant >4 mcg/mL BACTERIAL SUSCEPTIBILITY PANEL BY BARNEY     linezolid Sensitive 2 mcg/mL BACTERIAL SUSCEPTIBILITY PANEL BY BARNEY     oxacillin Resistant >2 mcg/mL BACTERIAL SUSCEPTIBILITY PANEL BY BARNEY     tetracycline Sensitive <=4 mcg/mL BACTERIAL SUSCEPTIBILITY PANEL BY BARNEY     trimethoprim-sulfamethoxazole Sensitive <=0.5/9.5 mcg/mL BACTERIAL SUSCEPTIBILITY PANEL BY BARNEY     vancomycin Sensitive 1 mcg/mL BACTERIAL SUSCEPTIBILITY PANEL BY BARNEY       4/1 BC x2 NGTD       Radiology Review:  All pertinent images / reports were reviewed as a part of this visit. CXR 4/3/22  Impression   Persistent diffuse bilateral opacity, increased at the right base, for which   pulmonary edema or pneumonia are leading considerations.        Assessment:     Patient Active Problem List   Diagnosis    Painful orthopaedic hardware ([address]    Foreign body of ankle, right, superficial, infected, initial encounter    Acute respiratory failure with hypoxia ([address]    Former smoker    New onset atrial fibrillation ([address]    Hardware complicating wound infection ([address]    Acute pulmonary edema ([address]    Paroxysmal atrial fibrillation ([address]    Nonrheumatic mitral valve regurgitation    Mitral valve prolapse       Remote history of ORIF R ankle     Cellulitic wound with exposed hardware. Wound breakdown from atrophy and pressure   S/p I&D to bone with hardware removal 3/31/22  MRSA isolated in operative culture    Pressure wound mid-R foot laterally, likely contiguous OM    Dementia  Cachexia   Non-ambulatory     Post-op hypoxemia, afib, VT, Cardiology managing  BCx are negative to date     NKDA       Recs:  Prognosis for wound healing is reduced in the context of non-ambulatory state, cachexia and continued pressure. Relief of pressure from the wound imperative to promote healing, along with improved nutrition     I suspect he may have contiguous OM in the mid-foot as well from pressure wound in that area      Start vanc  Anticipate 4-6 weeks IV abx   PICC ordered  Contact isolation  XR R foot       Recommended Follow-up, Labs or Other Treatments After Discharge:       ID INFUSION ORDERS  Vancomycin 1g IV q24 through 5/13/22   Weekly CBC diff, CMP, CRP, vanc trough faxed to [phone]   Routine CVC care  Call with ID related questions and schedule and appt with Dr. Christelle Osman prior to end date [phone]   Linda Izaguirre MD         D/w RN  No family in the room     Alphonse Gee M.D. Thank you for the opportunity to participate in the care of your patient.     Please do not hesitate to contact me:   [phone] office

## 2022-04-04 NOTE — PROGRESS NOTES
Physical Therapy  Facility/Department: [facility] C5 - MED SURG/ORTHO  Daily Treatment Note  NAME: Pool Najera  : 1934  MRN: [id number]    Date of Service: 2022    Discharge Recommendations:  ECF with PT   PT Equipment Recommendations  Equipment Needed: No    Assessment   Body structures, Functions, Activity limitations: Decreased functional mobility ; Decreased ADL status; Decreased ROM; Decreased strength;Decreased cognition;Decreased endurance;Decreased balance  Assessment: Pt progressing as exprected now using walker for  SPT to chair with mod/max A and poor knowledge of walker safety. Pt performed BLE seated exs and is pleasant and agreeable throughout. Pt is recommended for D/C back ot LTC with PT. Treatment Diagnosis: impaired functional mobility  Prognosis: Fair  Decision Making: Medium Complexity  PT Education: Goals;PT Role;General Safety; Disease Specific Education;Gait Training;Precautions  Patient Education: Educated on use of boot and wear schedule - will need reinforcement  REQUIRES PT FOLLOW UP: Yes  Activity Tolerance  Activity Tolerance: /66, HR 68 and O2 98% on 1L NC     Patient Diagnosis(es): The primary encounter diagnosis was Painful orthopaedic hardware ([address] A diagnosis of Pain due to any device, implant or graft, initial encounter was also pertinent to this visit. has a past medical history of Anemia, BPH (benign prostatic hyperplasia), Dementia ([address] History of seizure, [place] (hard of hearing), Hx: UTI (urinary tract infection), Hypertension, and Impaired mobility. has a past surgical history that includes back surgery; hernia repair; Ankle surgery (Right); and Ankle surgery (Right, 3/31/2022).     Restrictions  Restrictions/Precautions  Restrictions/Precautions: Fall Risk,Weight Bearing  Required Braces or Orthoses?: Yes  Lower Extremity Weight Bearing Restrictions  Right Lower Extremity Weight Bearing: Weight Bearing As Tolerated  Required Braces or Orthoses  Right Lower Extremity Brace: Boot  RLE Brace Type: Cam boot when ambulating per RN     Subjective   General  Chart Reviewed: Yes  Response To Previous Treatment: Patient with no complaints from previous session. Family / Caregiver Present: No  Referring Practitioner: Joan Purcell MD  Subjective  Subjective: Pt agreeable to therapy. Pleasantly confused, poor historian. General Comment  Comments: Pt supine in bed upon arrival. RN cleared pt for therapy. Pain Screening  Patient Currently in Pain: Denies  Vital Signs  Patient Currently in Pain: Denies           Objective   Bed mobility  Supine to Sit: Minimal assistance; Moderate assistance (to L with HOB elevated)  Sit to Supine: Unable to assess (remained up)  Transfers  Sit to Stand:  Moderate Assistance (to walker with cues or proper hand placement)  Stand to sit: Moderate Assistance;Maximum Assistance (sitting abruptly)  Bed to Chair: Moderate assistance;Maximum assistance (pivoting with walker to chair, unable to lift feet from floor and sitting too soon)  Ambulation  Ambulation?: No (pivoting with walker to chair; unable to lift feet from floor, sitting too soon needing mod/max A)  WB Status: WBAT RLE        Exercises  Hip Flexion: seated marches X 10 BLE  Knee Long Arc Quad: X 10 BLE with A RLE  Ankle Pumps: X 10 LLE in chair        AM-PAC Score     AM-PAC Inpatient Mobility without Stair Climbing Raw Score : 11 (04/04/22 0836)  AM-PAC Inpatient without Stair Climbing T-Scale Score : 35.66 (04/04/22 0836)  Mobility Inpatient CMS 0-100% Score: 67.36 (04/04/22 0836)  Mobility Inpatient without Stair CMS G-Code Modifier : CL (04/04/22 7747)       Goals  Short term goals  Time Frame for Short term goals: 5 days (4/6/22)  Short term goal 1: Pt will perform bed mobility with SBA; 3/4 min/mod A  Short term goal 2: Pt will perform transfers with mod A; 3/4 mod/max  A STS  Short term goal 3: By 4/3/22, pt will tolerate 10 reps of LE ther ex for improved strength and activity tolerance; 3/4 progressing 10 reps of 3 exs  Patient Goals   Patient goals : \"to get on my scooter\"    Plan    Plan  Times per week: 4-6x/wk  Current Treatment Recommendations: Strengthening,Balance Training,Functional Mobility Training,Transfer Training,Gait Training,Home Exercise Program,Patient/Caregiver Education & Training  Safety Devices  Type of devices:  All fall risk precautions in place,Call light within reach,Chair alarm in place,Gait belt,Left in chair,Nurse notified     Therapy Time   Individual Concurrent Group Co-treatment   Time In 0800         Time Out 0840         Minutes [address]

## 2022-04-04 NOTE — PROGRESS NOTES
Hospitalist Progress Note      PCP: Unknown Provider Result (Inactive)    Date of Admission: 3/31/2022    Chief Complaint: Post-operative hypoxia. Subjective: no new c/o. Medications:  Reviewed    Infusion Medications    sodium chloride 25 mL (04/03/22 5585)     Scheduled Medications    furosemide  20 mg IntraVENous BID    cetirizine  5 mg Oral Daily    pantoprazole  40 mg Oral QAM AC    tamsulosin  0.4 mg Oral Daily    levETIRAcetam  500 mg Oral BID    sodium chloride flush  10 mL IntraVENous 2 times per day    aspirin  325 mg Oral BID     PRN Meds: ipratropium, levalbuterol, perflutren lipid microspheres, ibuprofen, acetaminophen, sodium chloride flush, sodium chloride      Intake/Output Summary (Last 24 hours) at 4/4/2022 0917  Last data filed at 4/4/2022 3458  Gross per 24 hour   Intake 240 ml   Output 1550 ml   Net -1310 ml       Physical Exam Performed:    /68   Pulse 67   Temp 97.8 °F (36.6 °C) (Oral)   Resp 17   Ht 5' 3\" (1.6 m)   Wt 120 lb 4.8 oz (54.6 kg)   SpO2 96%   BMI 21.31 kg/m²     General appearance: No apparent distress, appears stated age and cooperative. HEENT: Pupils equal, round, and reactive to light. Conjunctivae/corneas clear. Neck: Supple, with full range of motion. No jugular venous distention. Trachea midline. Respiratory:  Normal respiratory effort. Clear to auscultation, bilaterally without Rales/Wheezes/Rhonchi. Cardiovascular: Regular rate and rhythm with normal S1/S2 without murmurs, rubs or gallops. Abdomen: Soft, non-tender, non-distended with normal bowel sounds. Musculoskeletal: No clubbing, cyanosis or edema bilaterally. Full range of motion without deformity. Skin: Skin color, texture, turgor normal.  No rashes or lesions. Neurologic:  Neurovascularly intact without any focal sensory/motor deficits.  Cranial nerves: II-XII intact, grossly non-focal.  Psychiatric: Alert and oriented, thought content appropriate, normal insight  Capillary Refill: Brisk,< 3 seconds   Peripheral Pulses: +2 palpable, equal bilaterally       Labs:   Recent Labs     04/01/22  1627 04/03/22  0711   WBC  --  5.0   HGB 13.0* 11.4*   HCT 40.6 35.1*   PLT  --  216     Recent Labs     04/01/22  1627 04/03/22  0711    137   K 5.1 4.1    101   CO2 22 26   BUN 20 27*   CREATININE 0.8 0.8   CALCIUM 9.2 8.8   PHOS 3.2 3.3     No results for input(s): AST, ALT, BILIDIR, BILITOT, ALKPHOS in the last 72 hours. No results for input(s): INR in the last 72 hours. Recent Labs     04/01/22  1937 04/01/22  2320 04/02/22  0248   TROPONINI <0.01 <0.01 <0.01       Urinalysis:      Lab Results   Component Value Date    NITRU Negative 04/01/2022    WBCUA 6-9 04/01/2022    BACTERIA Rare 04/01/2022    RBCUA 0-2 04/01/2022    BLOODU Negative 04/01/2022    SPECGRAV 1.025 04/01/2022    GLUCOSEU Negative 04/01/2022    GLUCOSEU Negative 01/08/2010       Consults:    IP CONSULT TO HOSPITALIST  IP CONSULT TO CARDIOLOGY  IP CONSULT TO INFECTIOUS DISEASES      Assessment/Plan:    Active Hospital Problems    Diagnosis     Paroxysmal atrial fibrillation ([facility] [I48.0]     Nonrheumatic mitral valve regurgitation [I34.0]     Mitral valve prolapse [I34.1]     Acute respiratory failure with hypoxia ([facility] [J96.01]     Former smoker [Z87.891]     Hardware complicating wound infection ([facility] [F63. 7XXA]     Acute pulmonary edema ([facility] [J81.0]     Foreign body of ankle, right, superficial, infected, initial encounter [S90.551A, L08.9]             Transient postop hypoxia-, likely from combination of anesthesia/sedation/atelectasis, pt is also a former smoker  -Continuous pulse oximetry monitoring initiated with PRN supplemental O2   -Encourage aggressive pulmonary toilet including incentive spirometry every 4H while awake  -Patient initiated on Xopenex and Atrovent nebulizer therapy scheduled 4 times daily  -CXR ordered stat to rule out aspiration pneumonia, pulmonary edema, or other cardiopulmonary abnormalities; pulmonary edema noted therefore lasix ordered as documented below  -VBG, lactate, BNP, troponin, and thyroid studies pending   -procal slightly elevated but do not feel any pulm infection at this time     New onset A. fib with pulmonary edema  -Admit to medical floor for continuous telemetry and pulse oximetry monitoring  -Blood cultures, electrolytes, thyroid studies to assess for secondary causes of this tachyarrhythmia  -BNP > 3000, and CXR with edema therefore begin IV Lasix 20 mg twice daily  -Patients CHADS2 score of 2 makes him a candidate for anticoagulation, but this will be deferred to cardiology recommendations in the setting of advanced age and risk for fall   -ECHO pending  -CARDS consulted  -noted VTach burst on 4/3 am, kept K/mag at 4 and 2 respectively     S/p removal of right ankle hardware  -Postoperative care as per primary ORTHO team  -Patient received Ancef 2 g preoperatively, but is currently on no antibiotics  -Blood cultures on 4/1 ngtd  - lactate wnl, procalcitonin slightly bumped  -Patient's pain is currently being managed with NSAIDs and Tylenol, therefore hypoxia not secondary to opiate administration   -surg cx noted mrsa  -ID consulted 4/2 given concerns for OM         DVT Prophylaxis: IPC     Recent Labs     04/03/22  0711        Diet: ADULT ORAL NUTRITION SUPPLEMENT; PM Snack; Standard High Calorie/High Protein Oral Supplement  ADULT DIET;  Dysphagia - Soft and Bite Sized  Code Status: Full Code      PT/OT Eval Status: seen w/ recs for SNF     Dispo - Patient is likely to remain in-house at least until Tues/Wed 5/6 April pending clinical course, subspecialty recs and placement decision      Jolie Lombard, MD

## 2022-04-04 NOTE — PROGRESS NOTES
1550 ml   Net -1310 ml     Wt Readings from Last 3 Encounters:   03/29/22 120 lb 4.8 oz (54.6 kg)   06/03/14 160 lb (72.6 kg)         Gen: Patient in NAD, resting comfortably, [place]  Neck: No JVD or bruits  Respiratory: CTAB no WRR  Chest: normal without deformity  Cardiovascular:RRR, S1S2, 1-2/6 USB murmur, normal PMI  Abdomen: Soft, NTND, Normal BS  Extremities: No clubbing, cyanosis, or edema  Neurological/Psychiatric: AxO x4, No gross motors/sensory deficits  Skin:  Warm and dry      Labs:  CBC:   Recent Labs     04/01/22  1627 04/03/22  0711   WBC  --  5.0   HGB 13.0* 11.4*   HCT 40.6 35.1*   MCV  --  88.8   PLT  --  216     BMP:   Recent Labs     04/01/22  1627 04/03/22  0711    137   K 5.1 4.1    101   CO2 22 26   PHOS 3.2 3.3   BUN 20 27*   CREATININE 0.8 0.8     MG:    Recent Labs     04/01/22  1627 04/03/22  0711   MG 1.90 1.80      PT/INR: No results for input(s): PROTIME, INR in the last 72 hours. APTT: No results for input(s): APTT in the last 72 hours. Cardiac Enzymes:   Recent Labs     04/01/22  1937 04/01/22  2320 04/02/22  0248   TROPONINI <0.01 <0.01 <0.01       Cardiac Studies:    CXR:  Persistent diffuse bilateral opacity, increased at the right base, for which   pulmonary edema or pneumonia are leading considerations.               Assessment and Plan     1. aFib: new,   2. Acute pulmonary edema   -4L net negatine  4. Mitral regurg 2/2 MVP: mod-sev  5. Normocytic anemia: mild  Ortho surgery:    - 3/31/2022 removal of ankle hardware    PLAN  1. Echo pending  2. Gentle diuresis  3. HR and BP controlled  4.  Start heparin ggtt for afib (if ok with ortho), may need conversion on D/c to PO, MR is protective however              [id number] Score for Atrial Fibrillation Stroke Risk   Risk   Factors  Component Value   C CHF No 0   H HTN No 0   A2 Age >= 76 Yes,  (80 y.o.) 2   D DM No 0   S2 Prior Stroke/TIA No 0   V Vascular Disease No 0   A Age 74-69 No,  (80 y.o.) 0   Sc Sex male 0 [id number]  Score  2   Score last updated 4/4/22 5:18 AM EDT    Click here for a link to the UpToDate guideline \"Atrial Fibrillation: Anticoagulation therapy to prevent embolization    Disclaimer: Risk Score calculation is dependent on accuracy of patient problem list and past encounter diagnosis. vasc          Patient Active Problem List   Diagnosis    Painful orthopaedic hardware ([address]    Foreign body of ankle, right, superficial, infected, initial encounter    Acute respiratory failure with hypoxia ([address]    Former smoker    New onset atrial fibrillation ([address]    Hardware complicating wound infection ([address]    Acute pulmonary edema ([address]    Paroxysmal atrial fibrillation ([address]    Nonrheumatic mitral valve regurgitation    Mitral valve prolapse         Thank you for allowing me to participate in the care of your patient. Please call me with any questions [phone].       Maria Luisa Agosto MD, [facility] Cardiologist  [address]  [phone] [facility]  [phone] [address] [place] [address]  4/4/2022 8:20 AM

## 2022-04-04 NOTE — PROGRESS NOTES
PICC/CVC insertion Procedure Note    Oral Ilia   Admitted- 3/31/2022  9:58 AM  Admission diagnosis- Pain due to any device, implant or graft, initial encounter [T85.848A]  Foreign body of ankle, right, superficial, infected, initial encounter [L80.287D, L08.9]      Attending Physician- Fabricio Brooke MD  Ordering Physician- Dr. Estrada Kapoor  Indication for Insertion: Home Antibiotics    Lab Results   Component Value Date    INR 1.24 (H) 04/04/2022    PROTIME 14.1 (H) 04/04/2022     Lab Results   Component Value Date    WBC 5.0 04/03/2022    HGB 11.4 (L) 04/03/2022    HCT 35.1 (L) 04/03/2022     04/03/2022     Lab Results   Component Value Date    CREATININE 0.8 04/03/2022    BUN 27 (H) 04/03/2022    GFR >60 10/12/2010       Catheter Insertion Date- 4/4/2022   Catheter Brand- Arrow Antimicrobial/Antithrombogenic   Lot Number- [id number]  Lumen-Single    Insertion Site- Right Brachial  Vein Diameter- 0.42 cm  Sudanese Size- 4.5  Catheter Length- 41 cm  Exposed Catheter Length- 1cm   PICC Tip Terminates in the [place] Atrial Junction- Yes  Easy insertion- Yes  Able to Aspirate Blood- Yes  Easy Flush- Yes    PICC Placement Confirmation- xray  PICC insertion successful- Yes  Ultrasound- yes    Okay To Use PICC- \"Yes    NURSES:  *please replace all existing IV tubing with new IV tubing prior to using the PICC for current IV infusions. *please remove any PIVs from right arm. *please refrain from obtaining BPs in the right   arm. All of the above may be sources of infection or damage to the PICC line/site.     Electronically signed by Alberto Cronin RN, RN on 4/4/2022 at 3:49 PM

## 2022-04-04 NOTE — CONSULTS
Pharmacy Note  Vancomycin Consult    Sherrod Lefort is a 80 y.o. male started on Vancomycin for Bone and Joint infection; consult received from Dr. Jayy Hermosillo to manage therapy. Also receiving the following antibiotics: N/A. Allergies:  Patient has no known allergies. Tmax: 97.8    Recent Labs     04/01/22  1627 04/03/22  0711   CREATININE 0.8 0.8       Recent Labs     04/03/22  0711   WBC 5.0       Estimated Creatinine Clearance: 49 mL/min (based on SCr of 0.8 mg/dL). Intake/Output Summary (Last 24 hours) at 4/4/2022 1055  Last data filed at 4/4/2022 0624  Gross per 24 hour   Intake 240 ml   Output 1550 ml   Net -1310 ml       Wt Readings from Last 1 Encounters:   03/29/22 120 lb 4.8 oz (54.6 kg)         Body mass index is 21.31 kg/m². Culture Date      Source                       Results  3/31                   Surgical                     MRSA     Loading dose (critically ill or in ICU, require dialysis or renal replacement therapy): Vancomycin 25 mg/kg IVPB x 1 (maximum 3000 mg). Maintenance dose: 15 mg/kg (maximum: 2000 mg/dose and 4500 mg/day) starting at the next dosing interval determined by renal function  Goal Vancomycin AUC: 400-600     Assessment/Plan:  Will initiate Vancomycin 1000 mg q24h. Calculated . Vancomycin trough ordered for 4/6 1030. Timing of trough level will be determined based on culture results, renal function, and clinical response. Thank you for the consult.   Carlos Doyle, PharmD 4/4/2022 10:57 AM

## 2022-04-04 NOTE — PLAN OF CARE
Patient's chart was reviewed this AM and appears patient's cardiac issues have stabilized. Cardiology consulted and awaiting follow up echo. Medicine is following as well regarding postop hypoxia. ID has been consulted for recs regarding MRSA positive cultures from bone at surgery on 3/31/22. Blood cultures continue to show no growth to date. Awaiting those recs to help determine discharge plan.       Arthur Alegria PA-C  [facility]

## 2022-04-04 NOTE — DISCHARGE INSTR - COC
Continuity of Care Form    Patient Name: Samantha Beebe   :  1934  MRN:  [id number]    Admit date:  3/31/2022  Discharge date:      Code Status Order: Full Code   Advance Directives:   [address] [facility] Documentation       Date/Time Healthcare Directive Type of Healthcare Directive Copy in [address] Agent's Name Healthcare Agent's Phone Number    22 1132 No, patient does not have an advance directive for healthcare treatment -- -- -- -- --            Admitting Physician:  Aziza Wise MD  PCP: Unknown Provider Result (Inactive)    Discharging Nurse: [facility] Unit/Room#: 5338/9820-74  Discharging Unit Phone Number: ***    Emergency Contact:   Extended Emergency Contact Information  Primary Emergency Contact: Chuyita Archer  Address: [address] [place] [address] Phone: [phone]  Mobile Phone: [phone]  Relation: Brother/Sister    Past Surgical History:  Past Surgical History:   Procedure Laterality Date    ANKLE SURGERY Right     ANKLE SURGERY Right 3/31/2022    RIGHT ANKLE HARDWARE REMOVAL performed by Aziza Wise MD at [place] [address]         Immunization History: There is no immunization history on file for this patient. Active Problems:  Patient Active Problem List   Diagnosis Code    Painful orthopaedic hardware [facility]) T84.84XA    Foreign body of ankle, right, superficial, infected, initial encounter S90.551A, L08.9    Acute respiratory failure with hypoxia ([address] J96.01    Former smoker Z87.891    New onset atrial fibrillation ([address] I48.91    Hardware complicating wound infection ([address] T84. 7XXA    Acute pulmonary edema (HCC) J81.0    Paroxysmal atrial fibrillation (HCC) I48.0    Nonrheumatic mitral valve regurgitation I34.0    Mitral valve prolapse I34.1       Isolation/Infection:   Isolation            Contact          Patient Infection Status Infection Onset Added Last Indicated Last Indicated By Review Planned Expiration Resolved Resolved By    [facility] 03/31/22 04/02/22 03/31/22 Culture, Surgical                Nurse Assessment:  Last Vital Signs: /68   Pulse 67   Temp 97.8 °F (36.6 °C) (Oral)   Resp 17   Ht 5' 3\" (1.6 m)   Wt 120 lb 4.8 oz (54.6 kg)   SpO2 96%   BMI 21.31 kg/m²     Last documented pain score (0-10 scale): Pain Level: 5  Last Weight:   Wt Readings from Last 1 Encounters:   03/29/22 120 lb 4.8 oz (54.6 kg)     Mental Status:  oriented and alert    IV Access:  - PICC - site  R Upper Arm, insertion date: 4/4/22    Nursing Mobility/ADLs:  Walking   Assisted  Transfer  Assisted  Bathing  Assisted  Dressing  Assisted  Toileting  Assisted  Feeding  Assisted  Med Admin  Assisted  Med Delivery   whole    Wound Care Documentation and Therapy:        Elimination:  Continence: Bowel: No  Bladder: No  Urinary Catheter: None   Colostomy/Ileostomy/Ileal Conduit: No       Date of Last BM: 4/5    Intake/Output Summary (Last 24 hours) at 4/4/2022 1057  Last data filed at 4/4/2022 0624  Gross per 24 hour   Intake 240 ml   Output 1550 ml   Net -1310 ml     I/O last 3 completed shifts: In: 480 [P.O.:480]  Out: 2150 [Urine:2150]    Safety Concerns: At Risk for Falls    Impairments/Disabilities:      Hearing    Nutrition Therapy:  Current Nutrition Therapy:   - Oral Diet:  General    Routes of Feeding: Oral  Liquids: No Restrictions  Daily Fluid Restriction: no  Last Modified Barium Swallow with Video (Video Swallowing Test): not done    Treatments at the Time of Hospital Discharge:   Respiratory Treatments: ***  Oxygen Therapy:  is not on home oxygen therapy.   Ventilator:    - No ventilator support    Rehab Therapies: Physical Therapy and Occupational Therapy  Weight Bearing Status/Restrictions: No weight bearing restrictions  Other Medical Equipment (for information only, NOT a DME order):  walker  Other Treatments: ***    Patient's personal belongings (please select all that are sent with patient):  Hearing Aides bilateral    RN SIGNATURE:  Electronically signed by Adilene Fowler RN on 4/7/22 at 1:32 PM EDT    CASE MANAGEMENT/SOCIAL WORK SECTION    Inpatient Status Date: 3/31/22    Readmission Risk Assessment Score:  Readmission Risk              Risk of Unplanned Readmission:  8           Discharging to Facility/ Agency    [facility]            [address], [facility] [address]       Phone: [phone]       Fax: [phone]          Dialysis Facility (if applicable)   Name:  Address:  Dialysis Schedule:  Phone:  Fax:    / signature: Electronically signed by Ai De Guzman RN on 4/7/22 at 9:34 AM EDT    PHYSICIAN SECTION    Prognosis: Fair    Condition at Discharge: Stable    Rehab Potential (if transferring to Rehab): Good    Recommended Labs or Other Treatments After Discharge:       Recommended Follow-up, Labs or Other Treatments After Discharge:       ID INFUSION ORDERS  Vancomycin 1g IV q24 through 5/13/22   Weekly CBC diff, CMP, CRP, vanc trough faxed to [phone]   Routine CVC care  Call with ID related questions and schedule and appt with Dr. Briana Walker prior to end date [phone]   Halle Canales MD         Physician Certification: I certify the above information and transfer of Pool Najera  is necessary for the continuing treatment of the diagnosis listed and that he requires [facility] for less 30 days.      Update Admission H&P: No change in H&P    PHYSICIAN SIGNATURE:  Electronically signed by Qian Norman MD on 4/7/22 at 12:21 PM EDT

## 2022-04-05 NOTE — PROGRESS NOTES
Hospitalist Progress Note      PCP: Unknown Provider Result (Inactive)    Date of Admission: 3/31/2022    Chief Complaint: Post-operative hypoxia. Subjective: no new c/o. Medications:  Reviewed    Infusion Medications    sodium chloride 25 mL (04/03/22 1725)     Scheduled Medications    vancomycin  1,000 mg IntraVENous Q24H    furosemide  20 mg IntraVENous BID    cetirizine  5 mg Oral Daily    pantoprazole  40 mg Oral QAM AC    tamsulosin  0.4 mg Oral Daily    levETIRAcetam  500 mg Oral BID    sodium chloride flush  10 mL IntraVENous 2 times per day    aspirin  325 mg Oral BID     PRN Meds: ipratropium, levalbuterol, perflutren lipid microspheres, ibuprofen, acetaminophen, sodium chloride flush, sodium chloride    No intake or output data in the 24 hours ending 04/05/22 0856    Physical Exam Performed:    /68   Pulse 71   Temp 98.5 °F (36.9 °C) (Oral)   Resp 18   Ht 5' 3\" (1.6 m)   Wt 120 lb 4.8 oz (54.6 kg)   SpO2 91%   BMI 21.31 kg/m²     General appearance: No apparent distress, appears stated age and cooperative. HEENT: Pupils equal, round, and reactive to light. Conjunctivae/corneas clear. Neck: Supple, with full range of motion. No jugular venous distention. Trachea midline. Respiratory:  Normal respiratory effort. Clear to auscultation, bilaterally without Rales/Wheezes/Rhonchi. Cardiovascular: Regular rate and rhythm with normal S1/S2 without murmurs, rubs or gallops. Abdomen: Soft, non-tender, non-distended with normal bowel sounds. Musculoskeletal: No clubbing, cyanosis or edema bilaterally. Full range of motion without deformity. Skin: Skin color, texture, turgor normal.  No rashes or lesions. Neurologic:  Neurovascularly intact without any focal sensory/motor deficits.  Cranial nerves: II-XII intact, grossly non-focal.  Psychiatric: Alert and oriented, thought content appropriate, normal insight  Capillary Refill: Brisk,< 3 seconds   Peripheral Pulses: +2 palpable, equal bilaterally       Labs:   Recent Labs     04/03/22  0711   WBC 5.0   HGB 11.4*   HCT 35.1*        Recent Labs     04/03/22  0711      K 4.1      CO2 26   BUN 27*   CREATININE 0.8   CALCIUM 8.8   PHOS 3.3     No results for input(s): AST, ALT, BILIDIR, BILITOT, ALKPHOS in the last 72 hours. Recent Labs     04/04/22  1115   INR 1.24*     No results for input(s): Zoila Lowers in the last 72 hours. Urinalysis:      Lab Results   Component Value Date    NITRU Negative 04/01/2022    WBCUA 6-9 04/01/2022    BACTERIA Rare 04/01/2022    RBCUA 0-2 04/01/2022    BLOODU Negative 04/01/2022    SPECGRAV 1.025 04/01/2022    GLUCOSEU Negative 04/01/2022    GLUCOSEU Negative 01/08/2010       Consults:    IP CONSULT TO HOSPITALIST  IP CONSULT TO CARDIOLOGY  IP CONSULT TO INFECTIOUS DISEASES  IP CONSULT TO PHARMACY      Assessment/Plan:    Active Hospital Problems    Diagnosis     Paroxysmal atrial fibrillation ([address] [I48.0]     Nonrheumatic mitral valve regurgitation [I34.0]     Mitral valve prolapse [I34.1]     Acute respiratory failure with hypoxia ([address] [J96.01]     Former smoker [Z87.891]     Hardware complicating wound infection ([address] [F96. 7XXA]     Acute pulmonary edema [facility] [J81.0]     Foreign body of ankle, right, superficial, infected, initial encounter [S90.551A, L08.9]             Transient postop hypoxia-, likely from combination of anesthesia/sedation/atelectasis, pt is also a former smoker  -Continuous pulse oximetry monitoring initiated with PRN supplemental O2   -Encourage aggressive pulmonary toilet including incentive spirometry every 4H while awake  -Patient initiated on Xopenex and Atrovent nebulizer therapy scheduled 4 times daily  -CXR ordered stat to rule out aspiration pneumonia, pulmonary edema, or other cardiopulmonary abnormalities; pulmonary edema noted therefore lasix ordered as documented below  -VBG, lactate, BNP, troponin, and thyroid studies pending   -procal slightly elevated but do not feel any pulm infection at this time     New onset A. fib with pulmonary edema  -Admit to medical floor for continuous telemetry and pulse oximetry monitoring  -Blood cultures, electrolytes, thyroid studies to assess for secondary causes of this tachyarrhythmia  -BNP > 3000, and CXR with edema therefore begin IV Lasix 20 mg twice daily  -Patients CHADS2 score of 2 makes him a candidate for anticoagulation, but this will be deferred to cardiology recommendations in the setting of advanced age and risk for fall   -ECHO pending  -CARDS consulted  -noted VTach burst on 4/3 am, kept K/mag at 4 and 2 respectively     S/p removal of right ankle hardware  -Postoperative care as per primary ORTHO team  -Patient received Ancef 2 g preoperatively, but is currently on no antibiotics  -Blood cultures on 4/1 ngtd  - lactate wnl, procalcitonin slightly bumped  -Patient's pain is currently being managed with NSAIDs and Tylenol, therefore hypoxia not secondary to opiate administration   -surg cx noted mrsa  -ID consulted 4/2 given concerns for OM       DVT Prophylaxis: IPC     Recent Labs     04/03/22  0711        Diet: ADULT ORAL NUTRITION SUPPLEMENT; PM Snack; Standard High Calorie/High Protein Oral Supplement  ADULT DIET;  Dysphagia - Soft and Bite Sized  Code Status: Full Code      PT/OT Eval Status: seen w/ recs for SNF     Dispo - Patient is likely to remain in-house at least until Wed/Thurs 6/7 April pending clinical course, subspecialty recs and placement decision      Milan Woo MD

## 2022-04-05 NOTE — PLAN OF CARE
Patient's chart was reviewed this AM. Patient appears stable, but with another episode of vtach early this AM. Cardiology is following     - Dr. Jaskaran Snyder, with ID, saw him yesterday and recommended PICC placement for IV abx (vanc 1g q24hrs through 5/13/22), weekly CBC diff, CMP, CRP and vanc trough faxed to her office and outpatient follow up prior to end date. - would appreciate social work [facility] for discharge planning with IV abx treatment.     - from ortho standpoint, ok to discharge to SNF once patient is stable from medical standpoint and placement/ discharge planning for IV abx treatment is in place      Fareed Ramesh PA-C  [facility]

## 2022-04-05 NOTE — PROGRESS NOTES
0866 Perfect serve sent to NP Stephanie Speak. Pt with a 35 beat run of vtach. Sleeping when it occurs. Remains AOx4. VSS, though BP 99 sys. We have been using Lasix to diuresis. Pt did this 2 days ago. Has been seen by cardiology. Please advise. Awaiting orders.

## 2022-04-05 NOTE — PROGRESS NOTES
Physical Therapy  Facility/Department: [facility] C5 - MED SURG/ORTHO  Daily Treatment Note  NAME: Tan Ochoa  : 1934  MRN: [id number]    Date of Service: 2022    Discharge Recommendations:  ECF with PT   PT Equipment Recommendations  Equipment Needed: No    Assessment   Body structures, Functions, Activity limitations: Decreased functional mobility ; Decreased ADL status; Decreased ROM; Decreased strength;Decreased cognition;Decreased endurance;Decreased balance  Assessment: Pt demos SPT to chair with mod/max A and decreased walker safety. Pt performed BLE seated exs and is pleasant and agreeable throughout. Pt is recommended for D/C back ot LTC with PT. Treatment Diagnosis: impaired functional mobility  Prognosis: Fair  Decision Making: Medium Complexity  PT Education: Goals;PT Role;General Safety; Disease Specific Education;Gait Training;Precautions  Patient Education: Educated on use of boot and wear schedule - will need reinforcement  REQUIRES PT FOLLOW UP: Yes  Activity Tolerance  Activity Tolerance: Patient Tolerated treatment well;Patient limited by fatigue  Activity Tolerance: 113/68  HR 79  O2 94%     Patient Diagnosis(es): The primary encounter diagnosis was Painful orthopaedic hardware ([address] A diagnosis of Pain due to any device, implant or graft, initial encounter was also pertinent to this visit. has a past medical history of Anemia, BPH (benign prostatic hyperplasia), Dementia ([address] History of seizure, [place] (hard of hearing), Hx: UTI (urinary tract infection), Hypertension, and Impaired mobility. has a past surgical history that includes back surgery; hernia repair; Ankle surgery (Right); and Ankle surgery (Right, 3/31/2022).     Restrictions  Restrictions/Precautions  Restrictions/Precautions: Fall Risk,Weight Bearing  Required Braces or Orthoses?: Yes  Lower Extremity Weight Bearing Restrictions  Right Lower Extremity Weight Bearing: Weight Bearing As Tolerated  Required Braces or Orthoses  Right Lower Extremity Brace: Boot  RLE Brace Type: Cam boot when ambulating per RN  Subjective   General  Chart Reviewed: Yes  Response To Previous Treatment: Patient with no complaints from previous session. Family / Caregiver Present: No  Referring Practitioner: Karley Smith MD  Subjective  Subjective: Pt agreeable to therapy. General Comment  Comments: Pt supine in bed upon arrival. RN cleared pt for therapy. Pain Screening  Patient Currently in Pain: Denies  Vital Signs  Patient Currently in Pain: Denies       Orientation  Orientation  Overall Orientation Status: Within Functional Limits  Cognition      Objective   Bed mobility  Supine to Sit: Moderate assistance (to R HOB elev)  Transfers  Sit to Stand:  Moderate Assistance (sw)  Stand to sit: Moderate Assistance  Bed to Chair: Moderate assistance;Maximum assistance (pivoting with walker to chair, difficult to lift feet from floor)  Comment: Cues for hand placement  Ambulation  Ambulation?: No  WB Status: WBAT RLE     Balance  Sitting - Static: Good;-  Sitting - Dynamic: Fair;+  Standing - Static: Poor;+  Standing - Dynamic: Poor;+ (sw)  Exercises  Hip Flexion: seated marches X 10 BLE  Knee Long Arc Quad: X 10 BLE with A RLE  Knee Short Arc Quad: X 10 BLE with A RLE  Ankle Pumps: X 10 LLE in chair   AM-PAC Score     AM-PAC Inpatient Mobility without Stair Climbing Raw Score : 11 (04/05/22 1601)  AM-PAC Inpatient without Stair Climbing T-Scale Score : 35.66 (04/05/22 1601)  Mobility Inpatient CMS 0-100% Score: 67.36 (04/05/22 1601)  Mobility Inpatient without Stair CMS G-Code Modifier : CL (04/05/22 1601)       Goals  Short term goals  Time Frame for Short term goals: 5 days (4/6/22)  Short term goal 1: Pt will perform bed mobility with SBA; 3/4 min/mod A  Short term goal 2: Pt will perform transfers with mod A; 3/4 mod/max  A STS  Short term goal 3: By 4/3/22, pt will tolerate 10 reps of LE ther ex for improved strength and activity tolerance; 3/4 progressing 10 reps of 3 exs  Patient Goals   Patient goals : \"to get on my scooter\"    Plan    Plan  Times per week: 4-6x/wk  Current Treatment Recommendations: Strengthening,Balance Training,Functional Mobility Training,Transfer Training,Gait Training,Home Exercise Program,Patient/Caregiver Education & Training  Safety Devices  Type of devices:  All fall risk precautions in place,Call light within reach,Chair alarm in place,Gait belt,Left in chair,Nurse notified     Therapy Time   Individual Concurrent Group Co-treatment   Time In 1346         Time Out 1420         Minutes 34         Timed Code Treatment Minutes: [facility]

## 2022-04-05 NOTE — PROGRESS NOTES
[facility]   Daily Cardiovascular Progress Note    Admit Date: 3/31/2022    Chief complaint: SOB  HPI: states improved      Medications/Labs all Reviewed:  Patient Active Problem List   Diagnosis    Painful orthopaedic hardware [facility])    Foreign body of ankle, right, superficial, infected, initial encounter    Acute respiratory failure with hypoxia [facility]    Former smoker    New onset atrial fibrillation ([address]    Hardware complicating wound infection ([facility]    Acute pulmonary edema (HCC)    Paroxysmal atrial fibrillation (HCC)    Nonrheumatic mitral valve regurgitation    Mitral valve prolapse       Medications:  vancomycin 1000 mg IVPB in 250 mL D5W addavial, Q24H  ipratropium (ATROVENT) 0.02 % nebulizer solution 0.5 mg, Q4H PRN  levalbuterol (XOPENEX) nebulizer solution 0.63 mg, Q4H PRN  perflutren lipid microspheres (DEFINITY) injection 1.65 mg, ONCE PRN  furosemide (LASIX) injection 20 mg, BID  cetirizine (ZYRTEC) tablet 5 mg, Daily  pantoprazole (PROTONIX) tablet 40 mg, QAM AC  tamsulosin (FLOMAX) capsule 0.4 mg, Daily  ibuprofen (ADVIL;MOTRIN) tablet 600 mg, Q6H PRN  acetaminophen (TYLENOL) tablet 650 mg, Q6H PRN  levETIRAcetam (KEPPRA) tablet 500 mg, BID  sodium chloride flush 0.9 % injection 10 mL, 2 times per day  sodium chloride flush 0.9 % injection 10 mL, PRN  0.9 % sodium chloride infusion, PRN  aspirin EC tablet 325 mg, BID           PHYSICAL EXAM   /68   Pulse 71   Temp 98.5 °F (36.9 °C) (Oral)   Resp 18   Ht 5' 3\" (1.6 m)   Wt 120 lb 4.8 oz (54.6 kg)   SpO2 91%   BMI 21.31 kg/m²    Vitals:    04/04/22 2024 04/05/22 0210 04/05/22 0356 04/05/22 0818   BP: 104/62   113/68   Pulse: 75 65  71   Resp: 18 18 18    Temp: 98.5 °F (36.9 °C)      TempSrc: Oral  Oral    SpO2: 93% 92%  91%   Weight:       Height:           No intake or output data in the 24 hours ending 04/05/22 1105  Wt Readings from Last 3 Encounters:   03/29/22 120 lb 4.8 oz (54.6 kg)   06/03/14 160 lb estimated at 78 mmHg (right atrial   pressure 8 mmHg, IVC not well visualized), consistent with severe pulmonary   hypertension. Irregular heart rate throughout study. Assessment and Plan     1. aFib: new, Currently in NSR   - CHADS VASC3 (Age, CHF  2. Acute pulmonary edema   -4L net negative  4. Mitral regurg 2/2 MVP: mod-sev  5. Pulmonary HTN: 78mmHg with reported normal RV  6. Normocytic anemia: mild  7. Ortho surgery:    - 3/31/2022 removal of ankle hardware  8. NSVT: 1 run <20sec this AM      PLAN  1. Cont gentle diuresis given above echo (all I/o missing yesterday   - strict I/o, qday weight, daily renal  2. Replete K  3. For CV risk reductionFrom cards standpoint, ok with ASA 81mg poqday   - add mod dose statin   - start BB  3 Pt denies CP or pressure, ? Underlying ischemia given pulm edema and NSVT. Long discussion with patient and sister and friend in room. I do feel patient has element of dementia. He is hard of hearing and he refused to put in his hearing aids. Discussed coronary disease but more importantly discussed elevated heart pressures as well as A. fib. They state patient did have a number of falling events and therefore was put in a nursing home but since then he has been in a scooter in a wheelchair and has not had any issues or problems. I did discuss A. fib and the risk for stroke as well as anticoagulation and the risk for bleeding. Patient states he is not interested in taking the coagulation and waves his hand know. The sister had some concern about the bleeding risk which given his advanced age and dementia may lead to more complications. He is currently in normal sinus rhythm which is reassuring but does have high pulmonary pressures which increases his stroke risk in a somewhat uncalculated away. For now we will hold off anticoagulation given patient's wishes.   I will look to slowly diurese patient and help the decompression of his heart with a reduced NSVT that

## 2022-04-05 NOTE — CARE COORDINATION
Chart reviewed, LOS 5days. Pt from [facility] at [facility] long term care, can return skilled, writer verified with [facility] central admissions. Per Cardio, plan to slowly diurese. CM will continue to follow pt's progress and coordinate discharge arrangements as appropriate.   EL Estrada-MAYANK

## 2022-04-06 NOTE — PROGRESS NOTES
[address] 81   Daily Cardiovascular Progress Note    Admit Date: 3/31/2022    CC: f/u for AFIB and SOB    HPI: Henok Nassar has no complaints today.       Medications/Labs all Reviewed:  Patient Active Problem List   Diagnosis    Painful orthopaedic hardware [facility])    Foreign body of ankle, right, superficial, infected, initial encounter    Acute respiratory failure with hypoxia ([address]    Former smoker    New onset atrial fibrillation ([address]    Hardware complicating wound infection ([address]    Acute pulmonary edema ([address]    Paroxysmal atrial fibrillation ([address]    Mitral valve insufficiency    Mitral valve prolapse       Medications:   atorvastatin  40 mg Oral Nightly    metoprolol tartrate  25 mg Oral BID    vancomycin  1,000 mg IntraVENous Q24H    furosemide  20 mg IntraVENous BID    cetirizine  5 mg Oral Daily    pantoprazole  40 mg Oral QAM AC    tamsulosin  0.4 mg Oral Daily    levETIRAcetam  500 mg Oral BID    sodium chloride flush  10 mL IntraVENous 2 times per day    aspirin  325 mg Oral BID       Infusion Medications:   sodium chloride 25 mL (04/03/22 1725)       Labs:  Lab Results   Component Value Date    WBC 7.5 04/06/2022    HGB 11.3 (L) 04/06/2022    HCT 34.0 (L) 04/06/2022    MCV 86.2 04/06/2022     04/06/2022     Lab Results   Component Value Date    CREATININE 0.9 04/06/2022    BUN 43 (H) 04/06/2022     04/06/2022    K 4.3 04/06/2022     04/06/2022    CO2 28 04/06/2022     Lab Results   Component Value Date    INR 1.24 (H) 04/04/2022    PROTIME 14.1 (H) 04/04/2022        Physical Examination:    BP 99/62   Pulse 67   Temp 98.2 °F (36.8 °C) (Oral)   Resp 19   Ht 5' 3\" (1.6 m)   Wt 128 lb 8.5 oz (58.3 kg)   SpO2 91%   BMI 22.77 kg/m²    Wt Readings from Last 3 Encounters:   04/05/22 128 lb 8.5 oz (58.3 kg)   06/03/14 160 lb (72.6 kg)       Intake/Output Summary (Last 24 hours) at 4/6/2022 0901  Last data filed at 4/5/2022 1312  Gross per 24 hour Intake 240 ml   Output --   Net 240 ml       Respiratory:  · Resp Assessment: Normal respiratory effort  · Resp Auscultation: Clear to auscultation bilaterally   Cardiovascular:  · Auscultation: regular rhythm and normal rate, normal S1S2, no murmur, rub or gallop  · Palpation:  Nl PMI  · JVP:  normal  · Extremities: No Edema  Abdomen:  · Soft, non-tender  · Normal bowel sounds  Extremities:  ·  No Cyanosis or Clubbing  Neurological/Psychiatric:  · Oriented to time, place, and person  · Non-anxious  Skin Warm and dry    Assessment:    Principal Problem:    Acute pulmonary edema (HCC)    Acute respiratory failure with hypoxia (HCC)    Paroxysmal atrial fibrillation (HCC)    Mitral valve insufficiency      Plan:  1. Remains in sinus  2. Appears euvolemic and without distress this AM  3. No longterm oral AC per prior notation. 4. Can switch to oral diuretics  5. OP f/U with primary cardologist  6. No further inpatient cardiac needs. All questions and concerns were addressed to the patient/family. Alternatives to my treatment were discussed. The note was completed using EMR. Every effort was made to ensure accuracy; however, inadvertent computerized transcription errors may be present.     Tawny Arenas MD, ULYSSES, [facility], [address] [place] [address]  4/6/2022 9:41 AM

## 2022-04-06 NOTE — PLAN OF CARE
Patient's chart was reviewed this AM and patient appears to be stable. - ID recs in place and PICC line placed 4/4. IV abx (vanc 1g q24hrs through 5/13/22), with weekly CBC diff, CMP, CRP and vanc trough faxed to her office and outpatient follow up prior to end date. - Cardiology recommends slow diuresis due to ECHO results, Replete K, ASA 81mg/day, add statin and start BB. Patient refuses anticoagulation at this time. - Medicine recommends incentive spirometry q4hr while awake and neb treatments ordered 4times daily    - Care coordination has placement planned for return to [facility]. Will plan for likely discharge today.       Rajinder Gaines PA-C  [facility]

## 2022-04-06 NOTE — PROGRESS NOTES
Infectious Disease Follow up Notes    CC :  OM ankle      Antibiotics:   vanc 1g q24    Admit Date:   3/31/2022  Hospital Day: 7    Subjective:   He remains AF  No significant interval changes     Objective:     Patient Vitals for the past 8 hrs:   BP Temp Pulse SpO2   04/06/22 0922 100/63 98.7 °F (37.1 °C) 75 93 %       EXAM:  General:   Frail elderly male  Alert, NAD  Cachectic     HEENT:   NCAT, PERRL, sclera anicteric    ABD:   Soft, NT   EXT: R ankle incisions well approximated  No drainage  periwound erythema noted, no expansion         LINE:   PICC RUE in place        Scheduled Meds:   atorvastatin  40 mg Oral Nightly    metoprolol tartrate  25 mg Oral BID    vancomycin  1,000 mg IntraVENous Q24H    furosemide  20 mg IntraVENous BID    cetirizine  5 mg Oral Daily    pantoprazole  40 mg Oral QAM AC    tamsulosin  0.4 mg Oral Daily    levETIRAcetam  500 mg Oral BID    sodium chloride flush  10 mL IntraVENous 2 times per day    aspirin  325 mg Oral BID       Continuous Infusions:   sodium chloride 25 mL (04/03/22 1725)          Data Review:    Lab Results   Component Value Date    WBC 7.5 04/06/2022    HGB 11.3 (L) 04/06/2022    HCT 34.0 (L) 04/06/2022    MCV 86.2 04/06/2022     04/06/2022     Lab Results   Component Value Date    CREATININE 0.9 04/06/2022    BUN 43 (H) 04/06/2022     04/06/2022    K 4.3 04/06/2022     04/06/2022    CO2 28 04/06/2022       Hepatic Function Panel:   Lab Results   Component Value Date    ALKPHOS 84 10/10/2010    ALT 9 10/10/2010    AST 19 10/10/2010    PROT 8.9 10/10/2010    BILITOT 0.28 10/10/2010    LABALBU 3.4 04/06/2022       Cultures:   3/31     Surgical culture Staph aureus mrsa   Antibiotic Interpretation Microscan   Method Status     ceFAZolin Resistant >16 mcg/mL BACTERIAL SUSCEPTIBILITY PANEL BY BARNEY       clindamycin Resistant >4 mcg/mL BACTERIAL SUSCEPTIBILITY PANEL BY BARNEY       DAPTOmycin Sensitive <=0.5 mcg/mL BACTERIAL SUSCEPTIBILITY PANEL BY BARNEY       erythromycin Resistant >4 mcg/mL BACTERIAL SUSCEPTIBILITY PANEL BY BARNEY       linezolid Sensitive 2 mcg/mL BACTERIAL SUSCEPTIBILITY PANEL BY BARNEY       oxacillin Resistant >2 mcg/mL BACTERIAL SUSCEPTIBILITY PANEL BY BARNEY       tetracycline Sensitive <=4 mcg/mL BACTERIAL SUSCEPTIBILITY PANEL BY BARNEY       trimethoprim-sulfamethoxazole Sensitive <=0.5/9.5 mcg/mL BACTERIAL SUSCEPTIBILITY PANEL BY BARNEY       vancomycin Sensitive 1 mcg/mL BACTERIAL SUSCEPTIBILITY PANEL BY BARNEY          4/1       BC x2 neg        Radiology Review:  All pertinent images / reports were reviewed as a part of this visit. CXR 4/3/22  Impression   Persistent diffuse bilateral opacity, increased at the right base, for which   pulmonary edema or pneumonia are leading considerations.        Assessment:     Patient Active Problem List    Diagnosis Date Noted    Paroxysmal atrial fibrillation ([address]     Mitral valve insufficiency     Mitral valve prolapse     Acute respiratory failure with hypoxia ([address] 04/01/2022    Former smoker 04/01/2022    New onset atrial fibrillation ([address] 04/01/2022    Hardware complicating wound infection ([address] 04/01/2022    Acute pulmonary edema ([address] 04/01/2022    Foreign body of ankle, right, superficial, infected, initial encounter 03/31/2022    Painful orthopaedic hardware ([address] 06/03/2014       Remote history of ORIF R ankle      Cellulitic wound with exposed hardware.   Wound breakdown from atrophy and pressure   S/p I&D to bone with hardware removal 3/31/22  MRSA isolated in operative culture      Dementia  Cachexia   Non-ambulatory      Post-op hypoxemia, afib, VT     NKDA     Plan:     IV vanc   DIAMOND was completed   Off load pressure     Ok for DC       No family in the room         Patrick Davis MD  Phone: [phone]   Fax : [phone]

## 2022-04-06 NOTE — PROGRESS NOTES
Physical Therapy  Facility/Department: [facility] C5 - MED SURG/ORTHO  Daily Treatment Note  NAME: King Lea  : 1934  MRN: [id number]    Date of Service: 2022    Discharge Recommendations:  ECF with PT   PT Equipment Recommendations  Equipment Needed: No    Assessment   Body structures, Functions, Activity limitations: Decreased functional mobility ; Decreased ADL status; Decreased ROM; Decreased strength;Decreased cognition;Decreased endurance;Decreased balance  Assessment: Pt limited by fatigue this date, declines EOB and OOB activity. Agreeable to BLE exercises in supine position. Continue to recommend return to LTC with PT upon d/c  Treatment Diagnosis: impaired functional mobility  Prognosis: Fair  Decision Making: Medium Complexity  PT Education: Goals;PT Role;General Safety; Disease Specific Education;Gait Training;Precautions; Home Exercise Program  Patient Education: Edu on importance of continued mobility and exercises performed-needs reinforcement  REQUIRES PT FOLLOW UP: Yes  Activity Tolerance  Activity Tolerance: Patient limited by fatigue;Patient limited by pain     Patient Diagnosis(es): The primary encounter diagnosis was Painful orthopaedic hardware ([address] A diagnosis of Pain due to any device, implant or graft, initial encounter was also pertinent to this visit. has a past medical history of Anemia, BPH (benign prostatic hyperplasia), Dementia ([address] History of seizure, [place] (hard of hearing), Hx: UTI (urinary tract infection), Hypertension, and Impaired mobility. has a past surgical history that includes back surgery; hernia repair; Ankle surgery (Right); and Ankle surgery (Right, 3/31/2022).     Restrictions  Restrictions/Precautions  Restrictions/Precautions: Fall Risk,Weight Bearing  Required Braces or Orthoses?: Yes  Lower Extremity Weight Bearing Restrictions  Right Lower Extremity Weight Bearing: Weight Bearing As Tolerated  Required Braces or Orthoses  Right Lower Extremity Brace: Boot  RLE Brace Type: Cam boot when ambulating per RN     Subjective   General  Chart Reviewed: Yes  Response To Previous Treatment: Patient with no complaints from previous session. Family / Caregiver Present: No  Referring Practitioner: Armando Mercado MD  Subjective  Subjective: Pt asleep upon arrival, agreeable to PT tx with encouragement. Pt reports not sleeping very well last night due to pain  General Comment  Comments: Rn cleared pt for therapy  Pain Screening  Patient Currently in Pain: Yes  Pain Assessment  Pain Assessment: 0-10  Pain Level: 5  Pain Location: Ankle  Pain Orientation: Right  Vital Signs  Patient Currently in Pain: Yes       Objective   Exercises  Straight Leg Raise: Lx10  Hamstring Sets: 3x30 second hamstring stretch in supine  Quad Sets: L x10 (difficulty on R due to pt keeping R knee in flexion and ER  Heelslides: Bx10 (AAROM on R)  Hip Abduction: Lx10  Ankle Pumps: Lx10 supine  Comments: RLE resting in Hip ER and knee flexion; pt demo difficulty moving RLE out of this position in supine.         AM-PAC Score     AM-PAC Inpatient Mobility without Stair Climbing Raw Score : 11 (04/06/22 1051)  AM-PAC Inpatient without Stair Climbing T-Scale Score : 35.66 (04/06/22 1051)  Mobility Inpatient CMS 0-100% Score: 67.36 (04/06/22 1051)  Mobility Inpatient without Stair CMS G-Code Modifier : CL (04/06/22 1051)       Goals  Short term goals  Time Frame for Short term goals: 5 days (4/6/22)  Short term goal 1: Pt will perform bed mobility with SBA; 4/6 NT  Short term goal 2: Pt will perform transfers with mod A; 4/6 NT  Short term goal 3: By 4/3/22, pt will tolerate 10 reps of LE ther ex for improved strength and activity tolerance; 3/6 progressing  Patient Goals   Patient goals : \"to get on my scooter\"    Plan    Plan  Times per week: 4-6x/wk  Current Treatment Recommendations: Strengthening,Balance Training,Functional Mobility Training,Transfer Training,Gait Training,Home Exercise Program,Patient/Caregiver Education & Training  Safety Devices  Type of devices: All fall risk precautions in place,Gait belt,Nurse notified,Bed alarm in place,Patient at risk for falls,Left in bed     Therapy Time   Individual Concurrent Group Co-treatment   Time In 0838         Time Out 1208         Minutes 23               If pt is unable to be seen after this session, please let this note serve as discharge summary. Please see case management note for discharge disposition. Thank you.     Jobie Spatz, PT

## 2022-04-06 NOTE — CARE COORDINATION
Chart review day 6- from [facility], plan to return skilled for IV ABX. PICC line placed. Anticipate dc Friday.

## 2022-04-06 NOTE — PROGRESS NOTES
intact, grossly non-focal.  Psychiatric: Alert and oriented, thought content appropriate, normal insight  Capillary Refill: Brisk,< 3 seconds   Peripheral Pulses: +2 palpable, equal bilaterally       Labs:   Recent Labs     04/05/22  0930 04/06/22  0619   WBC 6.6 7.5   HGB 11.9* 11.3*   HCT 36.0* 34.0*    219     Recent Labs     04/05/22  0930 04/06/22  0619    136   K 3.6 4.3    100   CO2 27 28   BUN 35* 43*   CREATININE 0.8 0.9   CALCIUM 9.1 9.3   PHOS 2.8 3.1     No results for input(s): AST, ALT, BILIDIR, BILITOT, ALKPHOS in the last 72 hours. Recent Labs     04/04/22  1115   INR 1.24*     No results for input(s): Alejandro Seed in the last 72 hours. Urinalysis:      Lab Results   Component Value Date    NITRU Negative 04/01/2022    WBCUA 6-9 04/01/2022    BACTERIA Rare 04/01/2022    RBCUA 0-2 04/01/2022    BLOODU Negative 04/01/2022    SPECGRAV 1.025 04/01/2022    GLUCOSEU Negative 04/01/2022    GLUCOSEU Negative 01/08/2010       Consults:    IP CONSULT TO HOSPITALIST  IP CONSULT TO CARDIOLOGY  IP CONSULT TO INFECTIOUS DISEASES  IP CONSULT TO PHARMACY      Assessment/Plan:    Active Hospital Problems    Diagnosis     Paroxysmal atrial fibrillation ([facility] [I48.0]     Mitral valve insufficiency [I34.0]     Mitral valve prolapse [I34.1]     Acute respiratory failure with hypoxia ([facility] [J96.01]     Former smoker [Z87.891]     Hardware complicating wound infection ([address] [D48. 7XXA]     Acute pulmonary edema ([facility] [J81.0]     Foreign body of ankle, right, superficial, infected, initial encounter [S90.551A, L08.9]             Transient postop hypoxia-, likely from combination of anesthesia/sedation/atelectasis, pt is also a former smoker  -Continuous pulse oximetry monitoring initiated with PRN supplemental O2   -Encourage aggressive pulmonary toilet including incentive spirometry every 4H while awake  -Patient initiated on Xopenex and Atrovent nebulizer therapy scheduled 4 times daily  -CXR ordered stat to rule out aspiration pneumonia, pulmonary edema, or other cardiopulmonary abnormalities; pulmonary edema noted therefore lasix ordered as documented below  -VBG, lactate, BNP, troponin, and thyroid studies pending   -procal slightly elevated but do not feel any pulm infection at this time     New onset A. fib with pulmonary edema  -Admit to medical floor for continuous telemetry and pulse oximetry monitoring  -Blood cultures, electrolytes, thyroid studies to assess for secondary causes of this tachyarrhythmia  -BNP > 3000, and CXR with edema therefore begin IV Lasix 20 mg twice daily  -Patients CHADS2 score of 2 makes him a candidate for anticoagulation, but this will be deferred to cardiology recommendations in the setting of advanced age and risk for fall   -ECHO pending  -CARDS consulted  -noted VTach burst on 4/3 am, kept K/mag at 4 and 2 respectively     S/p removal of right ankle hardware  -Postoperative care as per primary ORTHO team  -Patient received Ancef 2 g preoperatively, but is currently on no antibiotics  -Blood cultures on 4/1 ngtd  - lactate wnl, procalcitonin slightly bumped  -Patient's pain is currently being managed with NSAIDs and Tylenol, therefore hypoxia not secondary to opiate administration   -surg cx noted mrsa  -ID consulted 4/2 given concerns for OM       DVT Prophylaxis: IPC     Recent Labs     04/05/22  0930 04/06/22  0619    219     Diet: ADULT ORAL NUTRITION SUPPLEMENT; PM Snack; Standard High Calorie/High Protein Oral Supplement  ADULT DIET;  Dysphagia - Soft and Bite Sized  Code Status: Full Code      PT/OT Eval Status: seen w/ recs for SNF     Dispo - Patient is likely to remain in-house at least until Wed/Thurs 6/7 April pending clinical course, subspecialty recs and placement decision      Amanuel Shrestha MD

## 2022-04-06 NOTE — DISCHARGE SUMMARY
Department of Orthopedic Surgery  Physician Assistant   Discharge Summary    The Jaqueline Lee is a 80 y.o. male admitted for right ankle infected hardware s/p hardware removal and I&D. Jaqueline Lee was admitted to the floor following His recovery in the PACU. Discharge Diagnosis  right ankle infected hardware (MRSA) s/p hardware removal and I&D    Current Inpatient Medications    Current Facility-Administered Medications: atorvastatin (LIPITOR) tablet 40 mg, 40 mg, Oral, Nightly  metoprolol tartrate (LOPRESSOR) tablet 25 mg, 25 mg, Oral, BID  vancomycin 1000 mg IVPB in 250 mL D5W addavial, 1,000 mg, IntraVENous, Q24H  ipratropium (ATROVENT) 0.02 % nebulizer solution 0.5 mg, 0.5 mg, Nebulization, Q4H PRN  levalbuterol (XOPENEX) nebulizer solution 0.63 mg, 0.63 mg, Nebulization, Q4H PRN  perflutren lipid microspheres (DEFINITY) injection 1.65 mg, 1.5 mL, IntraVENous, ONCE PRN  furosemide (LASIX) injection 20 mg, 20 mg, IntraVENous, BID  cetirizine (ZYRTEC) tablet 5 mg, 5 mg, Oral, Daily  pantoprazole (PROTONIX) tablet 40 mg, 40 mg, Oral, QAM AC  tamsulosin (FLOMAX) capsule 0.4 mg, 0.4 mg, Oral, Daily  ibuprofen (ADVIL;MOTRIN) tablet 600 mg, 600 mg, Oral, Q6H PRN  acetaminophen (TYLENOL) tablet 650 mg, 650 mg, Oral, Q6H PRN  levETIRAcetam (KEPPRA) tablet 500 mg, 500 mg, Oral, BID  sodium chloride flush 0.9 % injection 10 mL, 10 mL, IntraVENous, 2 times per day  sodium chloride flush 0.9 % injection 10 mL, 10 mL, IntraVENous, PRN  0.9 % sodium chloride infusion, 25 mL, IntraVENous, PRN  aspirin EC tablet 325 mg, 325 mg, Oral, BID    Post-operatively the patients diet was advanced as tolerated and their dressing was changed on POD #1. The incision is dressing in place, clean, dry and intact with no signs of infection. The patient remained neurovascularly intact in the {Right/left upper lower:97935} and had intact pulses distally. Patients calf remained soft and showed no evidence of DVT.   The patient was able to move their {Right/left upper lower:51949} extremity without any problems post-operatively. Physical therapy and occupational therapy were consulted and began working with the patient post-operatively. The patient progressed with PT/OT as would be expected and continued to improve through their stay. The patients pain was initially controlled with IV medications but we were able to transition to oral pain medications soon after arrival to the floor and their pain remained under good control through their hospital stay. From a medical standpoint the patient remained stable and continued to have the medicine team follow throughout their stay. The patient will be discharged at this time to {Discharge Destination:65705::\"Home\"}  with their current diet restrictions and will continue to follow the precautions outlined to them by us and PT/OT. Condition on Discharge: Stable    Plan  {plan; follow up:43081}. Patient was instructed on the use of pain medications, the signs and symptoms of infection, and was given our number to call should they have any questions or concerns following discharge. For opioid prescriptions given at discharge the following statement is provided for compliance with [employer] rules. Patient being given increased dosage/quantity of opoid pain medication in excess of [employer] guidelines which noted a 30 MED daily of opioids due to the fact that he/she has undergone major orthopaedic surgery as outlined in rule 4731-11-13. Dosages and further duration of the pain medication will be re-evaluated at her post op visit in 2 weeks. Patient was educated on dosing expectations and limits of prescribing as a result of the new [facility] Board rules enacted August 31, 2017. Please also note that this is not the initial opoid prescription issued to this patient but a continuation of medication utilized during the hospital admission as noted in the medical record.  [place]ARRS report has also been utilized to screen for any abuse history or suspicious activity as outlined in [place]. All efforts have been taken to prevent abuse potential and misuse of opioid medications including education, screening, and close clinical follow up.

## 2022-04-07 NOTE — PROGRESS NOTES
Hospitalist Progress Note      PCP: Unknown Provider Result (Inactive)    Date of Admission: 3/31/2022    Chief Complaint: Post-operative hypoxia. Subjective: no new c/o. Medications:  Reviewed    Infusion Medications    sodium chloride 25 mL (04/03/22 0685)     Scheduled Medications    polyethylene glycol  17 g Oral Daily    atorvastatin  40 mg Oral Nightly    metoprolol tartrate  25 mg Oral BID    vancomycin  1,000 mg IntraVENous Q24H    furosemide  20 mg IntraVENous BID    cetirizine  5 mg Oral Daily    pantoprazole  40 mg Oral QAM AC    tamsulosin  0.4 mg Oral Daily    levETIRAcetam  500 mg Oral BID    sodium chloride flush  10 mL IntraVENous 2 times per day    aspirin  325 mg Oral BID     PRN Meds: bisacodyl, ipratropium, levalbuterol, perflutren lipid microspheres, ibuprofen, acetaminophen, sodium chloride flush, sodium chloride      Intake/Output Summary (Last 24 hours) at 4/7/2022 0965  Last data filed at 4/7/2022 5023  Gross per 24 hour   Intake 180 ml   Output 1150 ml   Net -970 ml       Physical Exam Performed:    /72   Pulse 68   Temp 98.1 °F (36.7 °C) (Oral)   Resp 16   Ht 5' 3\" (1.6 m)   Wt 128 lb 8.5 oz (58.3 kg)   SpO2 93%   BMI 22.77 kg/m²     General appearance: No apparent distress, appears stated age and cooperative. HEENT: Pupils equal, round, and reactive to light. Conjunctivae/corneas clear. Neck: Supple, with full range of motion. No jugular venous distention. Trachea midline. Respiratory:  Normal respiratory effort. Clear to auscultation, bilaterally without Rales/Wheezes/Rhonchi. Cardiovascular: Regular rate and rhythm with normal S1/S2 without murmurs, rubs or gallops. Abdomen: Soft, non-tender, non-distended with normal bowel sounds. Musculoskeletal: No clubbing, cyanosis or edema bilaterally. Full range of motion without deformity. Skin: Skin color, texture, turgor normal.  No rashes or lesions.   Neurologic:  Neurovascularly intact without any focal sensory/motor deficits. Cranial nerves: II-XII intact, grossly non-focal.  Psychiatric: Alert and oriented, thought content appropriate, normal insight  Capillary Refill: Brisk,< 3 seconds   Peripheral Pulses: +2 palpable, equal bilaterally       Labs:   Recent Labs     04/05/22  0930 04/06/22  0619   WBC 6.6 7.5   HGB 11.9* 11.3*   HCT 36.0* 34.0*    219     Recent Labs     04/05/22  0930 04/06/22  0619    136   K 3.6 4.3    100   CO2 27 28   BUN 35* 43*   CREATININE 0.8 0.9   CALCIUM 9.1 9.3   PHOS 2.8 3.1     No results for input(s): AST, ALT, BILIDIR, BILITOT, ALKPHOS in the last 72 hours. Recent Labs     04/04/22  1115   INR 1.24*     No results for input(s): Zettie Binet in the last 72 hours. Urinalysis:      Lab Results   Component Value Date    NITRU Negative 04/01/2022    WBCUA 6-9 04/01/2022    BACTERIA Rare 04/01/2022    RBCUA 0-2 04/01/2022    BLOODU Negative 04/01/2022    SPECGRAV 1.025 04/01/2022    GLUCOSEU Negative 04/01/2022    GLUCOSEU Negative 01/08/2010       Consults:    IP CONSULT TO HOSPITALIST  IP CONSULT TO CARDIOLOGY  IP CONSULT TO INFECTIOUS DISEASES  IP CONSULT TO PHARMACY      Assessment/Plan:    Active Hospital Problems    Diagnosis     Paroxysmal atrial fibrillation ([facility] [address] [I48.0]     Mitral valve insufficiency [I34.0]     Mitral valve prolapse [I34.1]     Acute respiratory failure with hypoxia ([address] [J96.01]     Former smoker [Z87.891]     Hardware complicating wound infection ([address] [D94. 7XXA]     Acute pulmonary edema ([address] [J81.0]     Foreign body of ankle, right, superficial, infected, initial encounter [S90.551A, L08.9]             Transient post-op hypoxia - likely from combination of anesthesia/sedation/atelectasis - now resolved.      New onset A. Fib - with pulmonary edema. Patients CHADS2 score of 2 makes him a candidate for anticoagulation, but this will be deferred to cardiology  - consulted and appreciated.  Appear to be in sinus w/out recs for anticoagulation at discharge     S/P removal of right ankle hardware - MRSA on operative Dx. Infectious Diseased consulted and appreciated w/ plan for continued IV Vancomycin - PICC placed. Dementia - w/out behavioral disturbance. Controlled on home medication regimen - continued. Continue supportive care and redirection as needed. Malnutrition - severe protein/calorie malnutrition. Likely 2nd to poor intake. Supplemental feeding/nutritional supplements as able.        DVT Prophylaxis: IPC     Recent Labs     04/05/22  0930 04/06/22  0619    219     Diet: ADULT ORAL NUTRITION SUPPLEMENT; PM Snack; Standard High Calorie/High Protein Oral Supplement  ADULT DIET;  Dysphagia - Soft and Bite Sized  Code Status: Full Code      PT/OT Eval Status: seen w/ recs for SNF     Dispo - Patient is likely to remain in-house at least until Thurs 7 April pending clinical course, subspecialty recs and placement decision      Liz Hess MD

## 2022-04-07 NOTE — CARE COORDINATION
CASE MANAGEMENT DISCHARGE SUMMARY      Discharge to: [facility] [address] completed: [employer] Exemption Notification (HENS) completed: NA    IMM given: (date) 4/7/22      Transportation:        Medical Transport explained to [facility]. Pt/family voice no agency preference. Agency used: [facility]   time: 3pm   Ambulance form completed: Yes    Confirmed discharge plan with:     Patient: yes    Family:  Yes     Name: Manuel Salvage number: [phone]              Left message   Facility/Agency, name:  [facility] [place] [address] faxed   Phone number for report to facility: [phone]                 CM spoke with Nina Ramirez at the [facility] regarding pt returning today. RN, name: Gennaro Rocha    Note: Discharging nurse to complete DIAMOND, reconcile AVS, and place final copy with patient's discharge packet. RN to ensure that written prescriptions for  Level II medications are sent with patient to the facility as per protocol.

## 2022-04-07 NOTE — DISCHARGE SUMMARY
Department of Orthopedic Surgery  Attending   Discharge Summary    The Braden Ascencio is a 80 y.o. male admitted for [facility] Ankle and Osteo. Braden Ascencio was admitted to the floor following His recovery in the PACU. Discharge Diagnosis  right Ankle Osteomyeliytis    Current Inpatient Medications    Current Facility-Administered Medications: polyethylene glycol (GLYCOLAX) packet 17 g, 17 g, Oral, Daily  bisacodyl (DULCOLAX) EC tablet 5 mg, 5 mg, Oral, Daily PRN  atorvastatin (LIPITOR) tablet 40 mg, 40 mg, Oral, Nightly  metoprolol tartrate (LOPRESSOR) tablet 25 mg, 25 mg, Oral, BID  vancomycin 1000 mg IVPB in 250 mL D5W addavial, 1,000 mg, IntraVENous, Q24H  ipratropium (ATROVENT) 0.02 % nebulizer solution 0.5 mg, 0.5 mg, Nebulization, Q4H PRN  levalbuterol (XOPENEX) nebulizer solution 0.63 mg, 0.63 mg, Nebulization, Q4H PRN  perflutren lipid microspheres (DEFINITY) injection 1.65 mg, 1.5 mL, IntraVENous, ONCE PRN  furosemide (LASIX) injection 20 mg, 20 mg, IntraVENous, BID  cetirizine (ZYRTEC) tablet 5 mg, 5 mg, Oral, Daily  pantoprazole (PROTONIX) tablet 40 mg, 40 mg, Oral, QAM AC  tamsulosin (FLOMAX) capsule 0.4 mg, 0.4 mg, Oral, Daily  ibuprofen (ADVIL;MOTRIN) tablet 600 mg, 600 mg, Oral, Q6H PRN  acetaminophen (TYLENOL) tablet 650 mg, 650 mg, Oral, Q6H PRN  levETIRAcetam (KEPPRA) tablet 500 mg, 500 mg, Oral, BID  sodium chloride flush 0.9 % injection 10 mL, 10 mL, IntraVENous, 2 times per day  sodium chloride flush 0.9 % injection 10 mL, 10 mL, IntraVENous, PRN  0.9 % sodium chloride infusion, 25 mL, IntraVENous, PRN  aspirin EC tablet 325 mg, 325 mg, Oral, BID    Post-operatively the patients diet was advanced as tolerated and their dressing was changed on POD #1. The incision is dressing in place, clean, dry and intact with no signs of infection. The patient remained neurovascularly intact in the right lower and had intact pulses distally. Patients calf remained soft and showed no evidence of DVT. screen for any abuse history or suspicious activity as outlined in [place]. All efforts have been taken to prevent abuse potential and misuse of opioid medications including education, screening, and close clinical follow up.

## 2022-04-07 NOTE — PROGRESS NOTES
Physical Therapy  Facility/Department: [facility] C5 - MED SURG/ORTHO  Daily Treatment Note  NAME: Tana Rocha  : 1934  MRN: [id number]    Date of Service: 2022    Discharge Recommendations:  ECF with PT   PT Equipment Recommendations  Equipment Needed: No  If pt discharges prior to next PT session this note will serve as discharge summary. Assessment   Body structures, Functions, Activity limitations: Decreased functional mobility ; Decreased ADL status; Decreased ROM; Decreased strength;Decreased cognition;Decreased endurance;Decreased balance  Assessment: Persistent fatigue noted, frequent cues to stay awake. Pt needed assist with LE activities, min/mod assist for bed mob, max assist sit to and from stand. Pt holds RLE flexion-educated in importance of trying to achieve ful knee extension for standing and walking. Pt joselito benefit from skilled PT to address deficits REcommend return to long term care with PT upon discharge  Treatment Diagnosis: impaired functional mobility  Prognosis: Fair  PT Education: Goals;PT Role;General Safety; Disease Specific Education;Gait Training;Precautions; Home Exercise Program  Patient Education: Disease specific: prevention of complications of bedrest, safety, need to stretch and position RLE to promote full knee extension. He voices understanding but needs reinforcement  Barriers to Learning: readiness to learn, fatigue  REQUIRES PT FOLLOW UP: Yes  Activity Tolerance  Activity Tolerance: Post standing attempts 95/60  HR69, SpO2 88%, rebounded to 93% with seated rest and cued breathing     Patient Diagnosis(es): The primary encounter diagnosis was Painful orthopaedic hardware ([address] A diagnosis of Pain due to any device, implant or graft, initial encounter was also pertinent to this visit.      has a past medical history of Anemia, BPH (benign prostatic hyperplasia), Dementia ([address] History of seizure, [place] (hard of hearing), Hx: UTI (urinary tract infection), Hypertension, and Impaired mobility. has a past surgical history that includes back surgery; hernia repair; Ankle surgery (Right); and Ankle surgery (Right, 3/31/2022). Restrictions  Restrictions/Precautions: Fall Risk,Weight Bearing  Right Lower Extremity Weight Bearing: Weight Bearing As Tolerated  Right Lower Extremity Brace: Boot  RLE Brace Type: Cam boot when ambulating per RN  Subjective   General  Chart Reviewed: Yes  Response To Previous Treatment: Patient with no complaints from previous session. Family / Caregiver Present: No  Referring Practitioner: Yaquelin Mahmood MD  Subjective  Subjective: pt agrees to therapy, needed cues to stay awake but reports he slept well  General Comment  Comments: Rn cleared pt for therapy, pt resting in bed on approach, holds RLE in flexion and ext rotation  Pain Screening  Patient Currently in Pain: Denies  Vital Signs  Pulse: 63  Heart Rate Source: Monitor  BP: (!) 95/55  BP Location: Left upper arm  Patient Position: Semi fowlers  Patient Currently in Pain: Denies  Oxygen Therapy  SpO2: 95 %       Orientation  Overall Orientation Status: Within Functional Limits     Objective   Bed mobility  Supine to Sit: Minimal assistance  Sit to Supine: Moderate assistance  Transfers  Sit to Stand: Maximum Assistance  Stand to sit: Moderate Assistance  Ambulation  Ambulation?: No (Stood 2 x with max assist to RW from EOB.  [place] to lift or advance RLE to take steps)  WB Status: WBAT RLE in CAM boot        Exercises  Straight Leg Raise: 10 x LLE  Hamstring Sets: 3x30 second hamstring stretch in supine  Heelslides: 10 x B assisted RLE  Gluteal Sets: 10 x B  Hip Abduction: 10 x B, assist RLE  Ankle Pumps: 10 x B active, mostly moves toes    10 x B with manual gastroc stretch                AM-PAC Score     AM-PAC Inpatient Mobility without Stair Climbing Raw Score : 10 (04/07/22 1045)  AM-PAC Inpatient without Stair Climbing T-Scale Score : 34.07 (04/07/22 1045)  Mobility Inpatient CMS 0-100% Score: 71.66 (04/07/22 1045)  Mobility Inpatient without Stair CMS G-Code Modifier : CL (04/07/22 1045)       Goals  Short term goals  Time Frame for Short term goals: 5 days (4/6/22)  Short term goal 1: Pt will perform bed mobility with SBA; 4/7 min/mod assist  Short term goal 2: Pt will perform transfers with mod A; 4/7 max assist EOB to RW  Short term goal 3: By 4/3/22, pt will tolerate 10 reps of LE ther ex for improved strength and activity tolerance; 4/7 met, ongoing  Patient Goals   Patient goals : \"to get on my scooter\"    Plan    Times per week: 4-6x/wk  Current Treatment Recommendations: Strengthening,Balance Training,Functional Mobility Training,Transfer Training,Gait Training,Home Exercise Program,Patient/Caregiver Education & Training  Safety Devices  Type of devices:  All fall risk precautions in place,Gait belt,Nurse notified,Bed alarm in place,Patient at risk for falls,Left in bed     Therapy Time   Individual Concurrent Group Co-treatment   Time In 1015         Time Out 1044         Minutes [address], PT

## 2022-04-21 PROBLEM — J96.90 RESPIRATORY FAILURE (HCC): Status: ACTIVE | Noted: 2022-01-01

## 2022-04-21 NOTE — PROGRESS NOTES
04/21/22 1613   NIV Type   NIV Started/Stopped On   Equipment Type v60   Mode Bilevel   Mask Type Full face mask   Mask Size Medium   Settings/Measurements   PIP Observed 18 cm H20   IPAP 15 cmH20   CPAP/EPAP 8 cmH2O   Rate Ordered 16   Resp (!) 32   FiO2  40 %   I Time/ I Time % 1 s   Vt Exhaled 580 mL   Minute Volume 12 Liters   Mask Leak (lpm) 23 lpm   Comfort Level Good   Using Accessory Muscles No   SpO2 99   Breath Sounds   Right Upper Lobe Clear   Right Middle Lobe Diminished   Right Lower Lobe Crackles   Left Upper Lobe Clear   Left Lower Lobe Crackles   Alarm Settings   Alarms On Y   Low Pressure 6 cmH2O   High Pressure  30 cmH2O   Apnea (secs) 20 secs   RR Low  6   RR High 50 br/min

## 2022-04-21 NOTE — H&P
Hospital Medicine History & Physical      PCP: Unknown Provider Result (Inactive)    Date of Admission: 4/21/2022    Date of Service: Pt seen/examined on 04/21/22 and Admitted to Inpatient with expected LOS greater than two midnights due to medical therapy. Chief Complaint:  hypoxia    History Of Present Illness: The patient is a kip RUBI with a h/o COPD, HTN, Afib, and dementia. He lives at [address] [place] [address]. In 2/2022 [facility] staff noticed that his skin was breaking down on his R lateral malleolus and a screw was sticking out. The patient had ankle surgery 30 years ago. He was brought to the ED and podiatry recommended outpatient f/u in the wound care clinic. 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 Operative culture grew MRSA, PICC was placed, and ID planned for vancomycin through 5/13. The patient was also treated with diuresis and RVR control during last admission. He was discharged back to the [facility]. His family says he hasn't been doing well in general.  He doesn't like his life anymore because he has lost his mobility. His dementia has progressed significantly. Today the patient was in respiratory distress and staff found him hypoxic. EMS put him on BiPAP and this was continued in the ED. Labs, exam, and imaging in the ED suggested a combination of pneumonia, CHF, and emphysema. His LFTs were in the 2000's for unclear reasons, perhaps congestive hepatopathy. He had a severe metabolic acidosis. Cr was a bit higher. I had a detailed discussion with the POA (sister) about how the patient was going to need a prolonged course of aggressive medical care. I explained that even if we were able to get him to survive (which was far from certain), there would probably be a high chance that something else would go wrong in the next few weeks and he would get readmitted again.   The POA was certain that the patient wouldn't want any further aggressive care at this point. He wouldn't want any further BiPAP, procedures, lab draws, or medications geared toward prolonging life. He would just want to focus on staying comfortable with medications like morphine and lorazepam.  The patient was admitted for intensive comfort measures and hospice was consulted. Past Medical History:          Diagnosis Date    Anemia     BPH (benign prostatic hyperplasia)     Dementia (HCC)     History of seizure     [place] (hard of hearing)     Hx: UTI (urinary tract infection)     Hypertension     Impaired mobility        Past Surgical History:          Procedure Laterality Date    ANKLE SURGERY Right     ANKLE SURGERY Right 3/31/2022    RIGHT ANKLE HARDWARE REMOVAL performed by Ibis Espinosa MD at [facility]         Medications Prior to Admission:      Prior to Admission medications    Medication Sig Start Date End Date Taking? Authorizing Provider   levETIRAcetam (KEPPRA) 500 MG tablet Take 500 mg by mouth 2 times daily    Historical Provider, MD   cephALEXin (KEFLEX) 500 MG capsule Take 500 mg by mouth 4 times daily    Historical Provider, MD   Loratadine 10 MG CAPS Take 10 mg by mouth. Historical Provider, MD   omeprazole (PRILOSEC) 20 MG capsule Take 20 mg by mouth daily. Historical Provider, MD   tamsulosin (FLOMAX) 0.4 MG capsule Take 0.4 mg by mouth daily. Historical Provider, MD   ibuprofen (ADVIL;MOTRIN) 600 MG tablet Take 600 mg by mouth every 6 hours as needed for Pain. Historical Provider, MD   promethazine (PHENERGAN) 25 MG/ML injection Inject 6.25 mg into the muscle every 6 hours as needed. Historical Provider, MD   acetaminophen (TYLENOL) 325 MG tablet Take 650 mg by mouth every 6 hours as needed for Pain. Historical Provider, MD       Allergies:  Patient has no known allergies.     Social History:      The patient [facility] lives at [facility]:   reports that he has quit smoking. He has never used smokeless tobacco.  ETOH:   reports no history of alcohol use. E-Cigarettes/Vaping Use     Questions Responses    E-Cigarette/Vaping Use Never User    Start Date     Passive Exposure     Quit Date     Counseling Given     Comments             Family History:      Reviewed in detail and negative for ESRD. Positive as follows:        Problem Relation Age of Onset    Heart Attack Father        REVIEW OF SYSTEMS COMPLETED:   Pertinent positives as noted in the HPI. All other systems reviewed and negative. PHYSICAL EXAM PERFORMED:    BP (!) 165/131   Pulse 112   Temp 95.3 °F (35.2 °C) (Rectal)   Resp 21   SpO2 99%       General appearance: In respiratory distress, appears stated age and cooperative. Appears chronically ill. HEENT:  Normal cephalic, atraumatic without obvious deformity. Pupils equal, round, and reactive to light. Extra ocular muscles intact. Conjunctivae/corneas clear. Neck: Supple, with full range of motion. No jugular venous distention. Trachea midline. Respiratory:  Increased respiratory effort. Bilaterally without Wheezes/Rhonchi. Diminished. Bilateral rales. Cardiovascular:  Regular rate and rhythm with normal S1/S2 without murmurs, rubs or gallops. Abdomen: Soft, non-tender, non-distended with normal bowel sounds. Musculoskeletal:  No clubbing, cyanosis or edema bilaterally. Full range of motion without deformity. Skin: Skin color, texture, turgor normal.  No rashes. R ankle dressing clean and dry. Neurologic:  Neurovascularly intact without any focal sensory/motor deficits.  Cranial nerves: II-XII intact, grossly non-focal.  Psychiatric:  Alert, oriented to self only, does not have insight, confused  Capillary Refill: Brisk,3 seconds, normal  Peripheral Pulses: +2 palpable, equal bilaterally       Labs:     Recent Labs     04/21/22  1440   WBC 13.3*   HGB 11.1*   HCT 35.1*   *     Recent Labs     04/21/22  1440 04/21/22  1619 * 134*   K 4.8 4.4    103   CO2 15* 12*   BUN 63* 64*   CREATININE 1.2 1.2   CALCIUM 8.9 8.5     Recent Labs     04/21/22  1440 04/21/22  1619   AST 2,773* 2,605*   ALT 2,224* 2,100*   BILITOT 1.1* 1.1*   ALKPHOS 589* 537*     Recent Labs     04/21/22  1440   INR 2.85*     Recent Labs     04/21/22  1440   TROPONINI 0.03*       Urinalysis:      Lab Results   Component Value Date    NITRU POSITIVE 04/21/2022    WBCUA 6-9 04/21/2022    BACTERIA 2+ 04/21/2022    RBCUA 21-50 04/21/2022    BLOODU SMALL 04/21/2022    SPECGRAV 1.020 04/21/2022    GLUCOSEU Negative 04/21/2022    GLUCOSEU Negative 01/08/2010       Radiology:     CXR: I have reviewed the CXR with the following interpretation: multifocal airspace disease  EKG:  I have reviewed the EKG with the following interpretation: nonspecific abnormalities    CT CHEST PULMONARY EMBOLISM W CONTRAST   Preliminary Result   No evidence of pulmonary embolism. There is small bilateral pleural   effusions left greater than right with superimposed infiltrate inferiorly   within the mid lung fields and upper lobes as well as throughout the lower   lung fields on chronic and emphysematous change. XR CHEST PORTABLE   Final Result   Addendum 1 of 1   ADDENDUM:   Left arm PICC noted with its tip projecting over the area of the left   brachiocephalic vein. Recommend repositioning the PICC more distally. The findings were sent to the Radiology Results [address] at    3:20   pm on 4/21/2022 to be communicated to a licensed caregiver. Final   Diffuse bilateral airspace disease. ASSESSMENT:    Active Hospital Problems    Diagnosis Date Noted    Respiratory failure [facility]) [J96.90] 04/21/2022     Priority: Medium    Acute respiratory failure with hypoxia ([address] [J96.01] 04/01/2022         PLAN:      Acute hypoxic respiratory failure, due to combination of acute diastolic CHF, pneumonia with sepsis, and underlying emphysema. Comfort care. 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 Comfort care. DVT Prophylaxis: not indicated  Diet: No diet orders on file  Code Status: DNR-CC    PT/OT Eval Status: not indicated    Dispo - to [facility] 4/22 if he is nearing death yet stable to transport. Asya Villa MD    Thank you Unknown Provider Result (Inactive) for the opportunity to be involved in this patient's care. If you have any questions or concerns please feel free to contact me at [phone].

## 2022-04-21 NOTE — PROGRESS NOTES
04/21/22 1425   NIV Type   $NIV $Daily Charge   Skin Assessment Clean, dry, & intact   NIV Started/Stopped On   Equipment Type V60   Mode CPAP   Mask Type Full face mask   Mask Size Medium   Settings/Measurements   CPAP/EPAP 10 cmH2O   Resp (!) 36   FiO2  60 %   I Time/ I Time % 1 s   Vt Exhaled 710 mL   Minute Volume 25 Liters   Mask Leak (lpm) 31 lpm   Comfort Level Good   Using Accessory Muscles No   SpO2 98   Breath Sounds   Right Upper Lobe Clear   Right Middle Lobe Diminished   Right Lower Lobe Crackles   Left Upper Lobe Clear   Left Lower Lobe Crackles   Alarm Settings   Alarms On Y   Low Pressure 6 cmH2O   High Pressure  30 cmH2O   Apnea (secs) 20 secs   RR Low  6   RR High 50 br/min

## 2022-04-21 NOTE — FLOWSHEET NOTE
Attended to pt and sister (POA) at [address] [place] [address] request to update/complete Advanced Directives and DNR for comfort. Pt's decision-making capacity was good and confirmed by attending medical team and writer. ACP notes complete, original paperwork returned to POA, copies labelled and filed. 04/21/22 1927   Encounter Summary   Encounter Overview/Reason  Advance Care Planning   Service Provided For: Patient and family together   Referral/Consult From: Physician   Support System Family members   Complexity of Encounter High   Begin Time 1820   End Time  1930   Total Time Calculated 70 min   Encounter    Type Family Care   Advance Care Planning   Type Completed AD/ACP document(s); Care Preferences Addressed   Assessment/Intervention/Outcome   Assessment Calm;Passive;Peaceful   Intervention Active listening;End of Life Care   Outcome Comfort   Plan and Referrals   Plan/Referrals No future visits requested

## 2022-04-21 NOTE — ACP (ADVANCE CARE PLANNING)
Advance Care Planning     Advance Care Planning Inpatient Note  [facility] Department    Today's Date: 4/21/2022    Unit: [place] [facility] TELE/MED SURG/ONC                    Received request from [employer] . Upon review of chart and communication with care team, patient's decision making abilities are not in question. . Patient was/were present in the room during visit. Goals of ACP Conversation:  Facilitate a discussion related to patient's goals of care as they align with the patient's values and beliefs. Health Care Decision Makers:       Primary Decision Maker: Danyel Haji - Brother/Sister - [phone]    Summary:  Completed New Documents    Advance Care Planning Documents (Patient Wishes):  Healthcare Power of /Advance Directive Appointment of [address]  Living Will/Advance Directive  Anatomical Gift/Organ Donation     Assessment:  Patient stated his wishes to not receive intervention or aggressive procedures. Medical care team agreed that pt has full capacity to sign. Pt completed Healthcare POA, Living Will, and made a special note about his desire to receive comfort care only. Interventions:  Assisted in the completion of documents according to patient's wishes at this time    Care Preferences Communicated:     Hospitalization:  If the patient's health worsens and it becomes clear that the chance of recovery is unlikely,     the patient prefers comfort-focused treatment without hospitalization. Ventilation:   If the patient, in their present state of health, suddenly became very ill and unable to breathe on their own,     the patient would NOT desire the use of a ventilator (breathing machine). If their health worsens and it becomes clear that the change of recovery is unlikely,     the patient would NOT desire the use of a ventilator (breathing machine).     Resuscitation:  In the event the patient's heart stopped as a result of an underlying serious health condition, the patient communicates a preference for      a natural death (no CPR). Outcomes/Plan:  New advance directive completed. Returned original document(s) to patient, as well as copies for distribution to appointed agents  Copy of advance directive given to staff to scan into medical record. Routed ACP note to attending provider or other IDT member.   Teach Back Method used to verify the patient's and/or Healthcare Decision Maker's understanding of key information in the advance directive documents    Electronically signed by Violette Adame on 4/21/2022 at 7:20 PM

## 2022-04-22 NOTE — PLAN OF CARE
Problem: Skin/Tissue Integrity  Goal: Absence of new skin breakdown  Description: 1. Monitor for areas of redness and/or skin breakdown  2. Assess vascular access sites hourly  3. Every 4-6 hours minimum:  Change oxygen saturation probe site  4. Every 4-6 hours:  If on nasal continuous positive airway pressure, respiratory therapy assess nares and determine need for appliance change or resting period.   Outcome: Progressing     Problem: Safety - Adult  Goal: Free from fall injury  4/22/2022 0343 by Deborah Segovia RN  Outcome: Progressing  4/22/2022 0343 by Deborah Segovia RN  Outcome: Progressing     Problem: ABCDS Injury Assessment  Goal: Absence of physical injury  4/22/2022 0343 by Deborah Segovia RN  Outcome: Progressing  4/22/2022 0343 by Deborah Segovia RN  Outcome: Progressing     Problem: Pain  Goal: Verbalizes/displays adequate comfort level or baseline comfort level  4/22/2022 0343 by Deborah Segovia RN  Outcome: Progressing  4/22/2022 0343 by Deborah Segovia RN  Outcome: Progressing

## 2022-04-22 NOTE — PROGRESS NOTES
Patient having cheyne-strokes breathing at this time, will continue with comfort medications as they come due. Patient sister remains at the bedside.

## 2022-04-22 NOTE — PROGRESS NOTES
Hospitalist Progress Note      PCP: Unknown Provider Result (Inactive)    Date of Admission: 4/21/2022    Chief Complaint: hypoxia      Subjective:  He is unresponsive, shallow breathing, diminishing O2 sats and BP. Hospice hasn't been contacted yet, and I'm not sure they would have much to add at this point, so will cancel consult for now. POA still agrees with the plan and they are happy with the patient's goals. Medications:  Reviewed    Infusion Medications    sodium chloride       Scheduled Medications    levETIRAcetam  500 mg Oral BID    pantoprazole  40 mg Oral QAM AC    tamsulosin  0.4 mg Oral Daily    sodium chloride flush  5-40 mL IntraVENous 2 times per day     PRN Meds: morphine, LORazepam, atropine, sodium chloride flush, sodium chloride, ondansetron **OR** ondansetron, polyethylene glycol, acetaminophen **OR** acetaminophen      Intake/Output Summary (Last 24 hours) at 4/22/2022 0854  Last data filed at 4/22/2022 0612  Gross per 24 hour   Intake    Output 1075 ml   Net -1075 ml       Physical Exam Performed:    BP (!) 82/51   Pulse 103   Temp 96.7 °F (35.9 °C) (Axillary)   Resp 14   Ht 5' 3\" (1.6 m)   Wt 139 lb 1.8 oz (63.1 kg)   SpO2 (!) 84%   BMI 24.64 kg/m²       General appearance: No apparent distress, appears stated age. Appears chronically ill. HEENT: Pupils equal, round, and reactive to light. Scleral icterus. Neck: Supple, with full range of motion. No jugular venous distention. Trachea midline. Respiratory:  Shallow breathing, mouth hanging open. Cardiovascular: Regular rate and rhythm with normal S1/S2 without rubs or gallops. 2/6 systolic murmur at the apex. Abdomen: Soft, non-tender, non-distended with normal bowel sounds. Musculoskeletal: No clubbing, cyanosis or edema bilaterally. R ankle is wrapped. Skin: Skin color, texture, turgor normal.  No rashes or lesions. Neurologic:  Neurovascularly intact without any focal sensory/motor deficits.  Cranial nerves: II-XII intact, grossly non-focal.  Psychiatric: unresponsive  Capillary Refill: Brisk,3 seconds, normal   Peripheral Pulses: +2 palpable, equal bilaterally       Labs:   Recent Labs     04/21/22  1440   WBC 13.3*   HGB 11.1*   HCT 35.1*   *     Recent Labs     04/21/22  1440 04/21/22  1619   * 134*   K 4.8 4.4    103   CO2 15* 12*   BUN 63* 64*   CREATININE 1.2 1.2   CALCIUM 8.9 8.5     Recent Labs     04/21/22  1440 04/21/22  1619   AST 2,773* 2,605*   ALT 2,224* 2,100*   BILITOT 1.1* 1.1*   ALKPHOS 589* 537*     Recent Labs     04/21/22  1440   INR 2.85*     Recent Labs     04/21/22  1440   TROPONINI 0.03*       Urinalysis:      Lab Results   Component Value Date    NITRU POSITIVE 04/21/2022    WBCUA 6-9 04/21/2022    BACTERIA 2+ 04/21/2022    RBCUA 21-50 04/21/2022    BLOODU SMALL 04/21/2022    SPECGRAV 1.020 04/21/2022    GLUCOSEU Negative 04/21/2022    GLUCOSEU Negative 01/08/2010       Radiology:  CT CHEST PULMONARY EMBOLISM W CONTRAST   Final Result   No evidence of pulmonary embolism. There is small bilateral pleural   effusions left greater than right with superimposed infiltrate inferiorly   within the mid lung fields and upper lobes as well as throughout the lower   lung fields on chronic and emphysematous change. XR CHEST PORTABLE   Final Result   Addendum 1 of 1   ADDENDUM:   Left arm PICC noted with its tip projecting over the area of the left   brachiocephalic vein. Recommend repositioning the PICC more distally. The findings were sent to the Radiology Results [address] at    3:20   pm on 4/21/2022 to be communicated to a licensed caregiver. Final   Diffuse bilateral airspace disease.                     Assessment/Plan:    Active Hospital Problems    Diagnosis     Respiratory failure ([address] [J96.90]      Priority: Medium    Acute respiratory failure with hypoxia (HCC) [J96.01]        The patient is a pleasant 80 Y M with a h/o COPD, HTN, Afib, and dementia. He lives at [address] [place] [address]. In 2/2022 [facility] staff noticed that his skin was breaking down on his R lateral malleolus and a screw was sticking out. The patient had ankle surgery 30 years ago. He was brought to the ED and podiatry recommended outpatient f/u in the wound care clinic. 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 Operative culture grew MRSA, PICC was placed, and ID planned for vancomycin through 5/13. The patient was also treated with diuresis and RVR control during last admission. He was discharged back to the [facility]. His family says he hasn't been doing well in general.  He doesn't like his life anymore because he has lost his mobility. His dementia has progressed significantly. Today the patient was in respiratory distress and staff found him hypoxic. EMS put him on BiPAP and this was continued in the ED. Labs, exam, and imaging in the ED suggested a combination of pneumonia, CHF, and emphysema. His LFTs were in the 2000's for unclear reasons, perhaps congestive hepatopathy. He had a severe metabolic acidosis. Cr was a bit higher. I had a detailed discussion with the POA (sister) about how the patient was going to need a prolonged course of aggressive medical care. I explained that even if we were able to get him to survive (which was far from certain), there would probably be a high chance that something else would go wrong in the next few weeks and he would get readmitted again. The POA was certain that the patient wouldn't want any further aggressive care at this point. He wouldn't want any further BiPAP, procedures, lab draws, or medications geared toward prolonging life. He would just want to focus on staying comfortable with medications like morphine and lorazepam.  The patient was admitted for intensive comfort measures and hospice was consulted.          Acute hypoxic respiratory failure, due to combination of acute diastolic CHF, pneumonia with sepsis, and underlying emphysema. Comfort care. Will consider hospice consult 4/23 if appropriate.     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 Comfort care. DVT Prophylaxis: not indicated  Diet: ADULT DIET; Regular  Code Status: DNR-CC    PT/OT Eval Status: not indicated    Dispo - I suspect he will pass away here today or tomorrow. I would rather not risk him dying during transportation to [facility], so hospice consult has been canceled for now.        Wilton Chavez MD

## 2022-04-22 NOTE — CARE COORDINATION
Writer met with family bedside review hospice benefit. Edu that Patient likely not able to tolerate transferring to [facility], but hospice may offer other supports such as after care/grief support to family. Family in agreement request referral be made to [address] [place] [address]. Referral sent, pending at this time. Writer did edu [facility] that clinical team does not feel Patient is medically stable for transfer. CHRIS Iqbal

## 2022-04-22 NOTE — PROGRESS NOTES
Pt nonverbal and unresponsive except to painful stimuli. Family at bedside and asked that garcia care and turns be forgone to keep pt comfortable. Pt seems comfortable and not in pain. All extremities still warm. F/C draining yellow urine. Pt stable at this time.

## 2022-04-22 NOTE — CARE COORDINATION
Chart reviewed. Spoke with Dr Fahad Mcnkight. Reported patient is actively dying. No need for hospice referral at this time, will likely die today. Will not follow at this time.  Deondre Rocha RN

## 2022-04-22 NOTE — PROGRESS NOTES
4 Eyes Skin Assessment     The patient is being assess for  Admission    I agree that 2 RN's have performed a thorough Head to Toe Skin Assessment on the patient. ALL assessment sites listed below have been assessed. Areas assessed by both nurses:   [x]   Head, Face, and Ears   [x]   Shoulders, Back, and Chest  [x]   Arms, Elbows, and Hands   [x]   Coccyx, Sacrum, and IschIum  [x]   Legs, Feet, and Heels        Does the Patient have Skin Breakdown? Yes LDA WOUND CARE was Initiated documentation include the Roberta-wound, Wound Assessment, Measurements, Dressing Treatment, Drainage, and Color\",         Spenser Prevention initiated:  No   Wound Care Orders initiated:  No      [facility] nurse consulted for Pressure Injury (Stage 3,4, Unstageable, DTI, NWPT, and Complex wounds), New and Established Ostomies:  No    This patient is awaiting admission to hospice and is on comfort care at this time. Dressing on patient's left foot CDI and left in place for comfort purposes.     Nurse 1 eSignature: Electronically signed by Malick Montgomery RN on 4/22/22 at 7:40 AM EDT    **SHARE this note so that the co-signing nurse is able to place an eSignature**    Nurse 2 eSignature: Electronically signed by Tyrus Ganser, RN on 4/22/22 at 7:50 AM EDT

## 2022-04-22 NOTE — PROGRESS NOTES
Patient remains non-verbal, responds to painful stimuli with deep labored rapid breathing, accompanied by periods of apnea. Patient reposition, bed bath and mouth care completed. Patient resting quietly at this time with sister at bedside. Will continue to monitor.

## 2022-04-22 NOTE — ED PROVIDER NOTES
CHIEF COMPLAINT  Shortness of Breath (from [facility], called for trouble breathing. pt O2 85% on room air, placed on CPAP by EMS for increased work of breathing.)      HISTORY OF PRESENT ILLNESS  Jaqueline Lee is a 80 y.o. male with a history of atrial fibrillation, hypertension, dementia, currently being treated with Zosyn and vancomycin for bilateral pneumonia as well as right lower extremity osteomyelitis who presents to the ED complaining of shortness of breath. Patient with increased work of breathing and hypoxia today at his facility. EMS reports oxygen saturation of 85% on a 5 L nasal cannula. He was placed on CPAP during transport. Last dose of Zosyn and vancomycin was prior to arrival.  No report of syncope, diaphoresis, emesis, diarrhea, urinary symptoms. Patient does not contribute significantly to the history. No other complaints, modifying factors or associated symptoms. I have reviewed the following from the nursing documentation.     Past Medical History:   Diagnosis Date    Anemia     BPH (benign prostatic hyperplasia)     Dementia ([address]     History of seizure     Ute (hard of hearing)     Hx: UTI (urinary tract infection)     Hypertension     Impaired mobility      Past Surgical History:   Procedure Laterality Date    ANKLE SURGERY Right     ANKLE SURGERY Right 3/31/2022    RIGHT ANKLE HARDWARE REMOVAL performed by Joaquin Berrios MD at [facility]       Family History   Problem Relation Age of Onset    Heart Attack Father      Social History     Socioeconomic History    Marital status: Single     Spouse name: Not on file    Number of children: Not on file    Years of education: Not on file    Highest education level: Not on file   Occupational History    Not on file   Tobacco Use    Smoking status: Former Smoker    Smokeless tobacco: Never Used   Vaping Use    Vaping Use: Never used   Substance and Sexual Activity    Alcohol use: Never    Drug use: Never    Sexual activity: Not on file   Other Topics Concern    Not on file   Social History Narrative    Not on file     Social Determinants of Health     Financial Resource Strain:     Difficulty of Paying Living Expenses: Not on file   Food Insecurity:     Worried About Running Out of Food in the Last Year: Not on file    Sudhir of Food in the Last Year: Not on file   Transportation Needs:     Lack of Transportation (Medical): Not on file    Lack of Transportation (Non-Medical):  Not on file   Physical Activity:     Days of Exercise per Week: Not on file    Minutes of Exercise per Session: Not on file   Stress:     Feeling of Stress : Not on file   Social Connections:     Frequency of Communication with Friends and Family: Not on file    Frequency of Social Gatherings with Friends and Family: Not on file    Attends Jehovah's witness Services: Not on file    Active Member of Clubs or Organizations: Not on file    Attends Club or Organization Meetings: Not on file    Marital Status: Not on file   Intimate Partner Violence:     Fear of Current or Ex-Partner: Not on file    Emotionally Abused: Not on file    Physically Abused: Not on file    Sexually Abused: Not on file   Housing Stability:     Unable to Pay for Housing in the Last Year: Not on file    Number of [place] in the Last Year: Not on file    Unstable Housing in the Last Year: Not on file     Current Facility-Administered Medications   Medication Dose Route Frequency Provider Last Rate Last Admin    morphine sulfate (PF) injection 4 mg  4 mg IntraVENous Q2H PRN Therese Goode MD   4 mg at 04/21/22 2050    LORazepam (ATIVAN) injection 1 mg  1 mg IntraVENous Q4H PRN Therese Goode MD   1 mg at 04/21/22 1842    atropine 1 % ophthalmic solution 1 drop  1 drop SubLINGual Q4H PRN Therese Goode MD        levETIRAcetam (KEPPRA) tablet 500 mg  500 mg Oral BID Therese Goode MD        [START ON 4/22/2022] pantoprazole (PROTONIX) tablet 40 mg  40 mg Oral QAM AC Jared Harper MD        tamsulosin (FLOMAX) capsule 0.4 mg  0.4 mg Oral Daily Jared Harper MD        sodium chloride flush 0.9 % injection 5-40 mL  5-40 mL IntraVENous 2 times per day Jared Harper MD   10 mL at 04/21/22 1900    sodium chloride flush 0.9 % injection 5-40 mL  5-40 mL IntraVENous PRN Jared Harper MD        0.9 % sodium chloride infusion   IntraVENous PRN Jared Harper MD        ondansetron (ZOFRAN-ODT) disintegrating tablet 4 mg  4 mg Oral Q8H PRN Jared Harper MD        Or    ondansetron (ZOFRAN) injection 4 mg  4 mg IntraVENous Q6H PRN Jared Harper MD        polyethylene glycol (GLYCOLAX) packet 17 g  17 g Oral Daily PRN Jared Harper MD        acetaminophen (TYLENOL) tablet 650 mg  650 mg Oral Q6H PRN Jared Harper MD        Or    acetaminophen (TYLENOL) suppository 650 mg  650 mg Rectal Q6H PRN Jared Harper MD         No Known Allergies    REVIEW OF SYSTEMS  Unable to obtain due to respiratory distress. Limited accuracy due to suspected dementia. PHYSICAL EXAM  BP 90/60   Pulse 111   Temp 97.4 °F (36.3 °C) (Axillary)   Resp 28   Ht 5' 3\" (1.6 m)   Wt 139 lb 1.8 oz (63.1 kg)   SpO2 (!) 85%   BMI 24.64 kg/m²   GENERAL APPEARANCE: Awake and alert. Cooperative. Appears elderly, frail, chronically ill, with significant increased work of breathing. HEAD: Normocephalic. Atraumatic. EYES: PERRL. EOM's grossly intact. ENT: Mucous membranes are moist.  Hard of hearing. Poor dentition. NECK: Supple, trachea midline. HEART: Irregularly irregular, rate 118. Normal S1, S2. No murmurs, rubs or gallops. LUNGS: Significant tachypnea, poor to moderate air movement, bibasilar rales. Intermittent nonproductive cough observed. ABDOMEN: Soft. Non-distended. Non-tender. No guarding or rebound. Normal Bowel sounds. EXTREMITIES: Right lower extremity bandaged. SKIN: Warm and dry. No acute rashes. NEUROLOGICAL: Alert and interactive but with suspected underlying dementia. CN II-XII intact. No gross facial drooping. Strength grossly symmetrical but globally weak. PSYCHIATRIC: Normal mood and affect. LABS  I have reviewed all labs for this visit.    Results for orders placed or performed during the hospital encounter of 04/21/22   COVID-19, Rapid    Specimen: Nasopharyngeal Swab; Throat swab   Result Value Ref Range    SARS-CoV-2, NAAT Not Detected Not Detected   Rapid influenza A/B antigens    Specimen: Nasopharyngeal; Nasal swab   Result Value Ref Range    Rapid Influenza A Ag Negative Negative    Rapid Influenza B Ag Negative Negative   CBC with Auto Differential   Result Value Ref Range    WBC 13.3 (H) 4.0 - 11.0 K/uL    RBC 3.87 (L) 4.20 - 5.90 M/uL    Hemoglobin 11.1 (L) 13.5 - 17.5 g/dL    Hematocrit 35.1 (L) 40.5 - 52.5 %    MCV 90.6 80.0 - 100.0 fL    MCH 28.5 26.0 - 34.0 pg    MCHC 31.5 31.0 - 36.0 g/dL    RDW 16.8 (H) 12.4 - 15.4 %    Platelets 928 (L) 182 - 450 K/uL    MPV 9.9 5.0 - 10.5 fL    Neutrophils % 92.3 %    Lymphocytes % 1.8 %    Monocytes % 5.6 %    Eosinophils % 0.1 %    Basophils % 0.2 %    Neutrophils Absolute 12.3 (H) 1.7 - 7.7 K/uL    Lymphocytes Absolute 0.2 (L) 1.0 - 5.1 K/uL    Monocytes Absolute 0.7 0.0 - 1.3 K/uL    Eosinophils Absolute 0.0 0.0 - 0.6 K/uL    Basophils Absolute 0.0 0.0 - 0.2 K/uL   Comprehensive Metabolic Panel   Result Value Ref Range    Sodium 135 (L) 136 - 145 mmol/L    Potassium 4.8 3.5 - 5.1 mmol/L    Chloride 100 99 - 110 mmol/L    CO2 15 (L) 21 - 32 mmol/L    Anion Gap 20 (H) 3 - 16    Glucose 90 70 - 99 mg/dL    BUN 63 (H) 7 - 20 mg/dL    CREATININE 1.2 0.8 - 1.3 mg/dL    GFR Non- 57 (A) >60    GFR African American >60 >60    Calcium 8.9 8.3 - 10.6 mg/dL    Total Protein 7.3 6.4 - 8.2 g/dL    Albumin 3.1 (L) 3.4 - 5.0 g/dL    Albumin/Globulin Ratio 0.7 (L) 1.1 - 2.2    Total Bilirubin 1.1 (H) 0.0 - 1.0 mg/dL    Alkaline Phosphatase blood bank testing   Result Value Ref Range    Specimen Status IZAIAH    Microscopic Urinalysis   Result Value Ref Range    WBC, UA 6-9 (A) 0 - 5 /HPF    RBC, UA 21-50 (A) 0 - 4 /HPF    Epithelial Cells, UA 0-1 0 - 5 /HPF    Bacteria, UA 2+ (A) None Seen /HPF   Comprehensive Metabolic Panel w/ Reflex to MG   Result Value Ref Range    Sodium 134 (L) 136 - 145 mmol/L    Potassium reflex Magnesium 4.4 3.5 - 5.1 mmol/L    Chloride 103 99 - 110 mmol/L    CO2 12 (LL) 21 - 32 mmol/L    Anion Gap 19 (H) 3 - 16    Glucose 95 70 - 99 mg/dL    BUN 64 (H) 7 - 20 mg/dL    CREATININE 1.2 0.8 - 1.3 mg/dL    GFR Non- 57 (A) >60    GFR African American >60 >60    Calcium 8.5 8.3 - 10.6 mg/dL    Total Protein 6.7 6.4 - 8.2 g/dL    Albumin 3.0 (L) 3.4 - 5.0 g/dL    Albumin/Globulin Ratio 0.8 (L) 1.1 - 2.2    Total Bilirubin 1.1 (H) 0.0 - 1.0 mg/dL    Alkaline Phosphatase 537 (H) 40 - 129 U/L    ALT 2,100 (H) 10 - 40 U/L    AST 2,605 (H) 15 - 37 U/L   EKG 12 Lead   Result Value Ref Range    Ventricular Rate 114 BPM    Atrial Rate 105 BPM    QRS Duration 80 ms    Q-T Interval 294 ms    QTc Calculation (Bazett) 405 ms    R Axis 28 degrees    T Axis 202 degrees    Diagnosis       Baseline artifactAtrial fibrillation with rapid ventricular responseST & T wave abnormality, consider anterolateral ischemiaAbnormal ECGWhen compared with ECG of 02-APR-2022 12:27,Atrial fibrillation has replaced Sinus rhythmST now depressed in Anterior leadsNonspecific T wave abnormality now evident in Inferior leadsT wave inversion now evident in Anterolateral leadsConfirmed by Lavon Lazo MD, [facility] ([id number]) on 4/21/2022 7:04:39 PM       EKG  Atrial fibrillation with RVR, rate 114, normal axis, QTC within normal limits, nonspecific anterolateral ST and T wave abnormalities new from prior on April 2, 2022      RADIOLOGY  X-RAYS:  I have reviewed radiologic plain film image(s).   ALL OTHER NON-PLAIN FILM IMAGES SUCH AS CT, ULTRASOUND AND MRI HAVE BEEN READ BY THE RADIOLOGIST. CT CHEST PULMONARY EMBOLISM W CONTRAST   Final Result   No evidence of pulmonary embolism. There is small bilateral pleural   effusions left greater than right with superimposed infiltrate inferiorly   within the mid lung fields and upper lobes as well as throughout the lower   lung fields on chronic and emphysematous change. XR CHEST PORTABLE   Final Result   Addendum 1 of 1   ADDENDUM:   Left arm PICC noted with its tip projecting over the area of the left   brachiocephalic vein. Recommend repositioning the PICC more distally. The findings were sent to the Radiology Results [address] at    3:20   pm on 4/21/2022 to be communicated to a licensed caregiver. Final   Diffuse bilateral airspace disease. Rechecks: Physical assessment performed. Improvement in oxygenation however patient still appears quite ill. Critical Care: 45min. Management of acute hypoxemic respiratory failure necessitating noninvasive positive pressure support. Treatment of suspected CHF exacerbation with multiple doses of IV Lasix. Treatment of healthcare associated pneumonia with IV antibiotics. Extensive labs and imaging with frequent reassessments. ED COURSE/MDM  Patient seen and evaluated. Old records reviewed. Labs and imaging reviewed and results discussed with patient and his sister. Patient with significant medical conditions currently on broad-spectrum IV antibiotics for osteomyelitis as well as pneumonia presented to the emergency department today with acute hypoxemic respiratory failure. He has significant tachypnea, mild atrial fibrillation with RVR, and transaminitis of uncertain significance. Patient placed on BiPAP and was given Solu-Medrol and Lasix for his respiratory distress as well as IV cefepime for his pneumonia. He is not yet due for another dose of Zosyn or vancomycin. Patient admitted to the hospitalist service. According to his sister who also states she is his medical power of  patient would not want to be intubated or placed on a ventilator. Current Discharge Medication List          CLINICAL IMPRESSION  1. Acute hypoxemic respiratory failure (HCC)    2. Pneumonia of both lungs due to infectious organism, unspecified part of lung    3. Atrial fibrillation with RVR ([facility] [address]    4. Transaminitis        Blood pressure 90/60, pulse 111, temperature 97.4 °F (36.3 °C), temperature source Axillary, resp. rate 28, height 5' 3\" (1.6 m), weight 139 lb 1.8 oz (63.1 kg), SpO2 (!) 85 %. DISPOSITION  Carol Jauregui was admitted in guarded condition.         Isabel Perrin MD  04/21/22 [id number]

## 2022-04-23 NOTE — PROGRESS NOTES
[facility] representatives just picked up patient's remains, Sister had picked up all personal belongings.

## 2022-04-23 NOTE — PROGRESS NOTES
This nurse went into the patient's room and patient was not breathing, no heart rate noted. This was confirmed by another nurse, charge nurse Jemima Garcia at 1364. Baltazar Irwin CNP notified and okayed the release of patient's body. 1956: 1139 Irwin Kristofer Hayley notified and stated they will not be following up and could release the body. Ref# [id number]    2003: [facility] [phone] and stated they would pick-up patient's remains in 2 hours. Patient's sister  -Catherene Frame present and updated through the process and left around 2030 and stated no other family would be coming in and it was okay for post partum care to be completed for remains release. 2 nurses completed postpartum care.

## 2022-04-23 NOTE — DISCHARGE SUMMARY
Hospital Medicine Discharge Summary    Patient ID: Piper Alas      Patient's PCP: Unknown Provider Result (Inactive)    Admitting Physician: Delilah Walters MD  Discharge Physician: Asya Villa MD     Admit Date: 2022     Disposition:     Discharge Diagnoses: Active Hospital Problems    Diagnosis     Respiratory failure ([facility] [address] [J96.90]      Priority: Medium    Acute respiratory failure with hypoxia ([address] [J96.01]        Date of Death: 22  Time of Death: 20:38    Immediate Cause of Death: acute diastolic CHF  Underlying Conditions: Other Contributing Conditions:      Hospital Course:  The patient is a pleasant 80 Y M with a h/o COPD, HTN, Afib, and dementia. Mario Gillis lives at [facility].  In 2022 [facility] staff noticed that his skin was breaking down on his R lateral malleolus and a screw was sticking out.  The patient had ankle surgery 30 years ago. Mario Gillis was brought to the ED and podiatry recommended outpatient f/u in the wound care clinic. Mario Gillis was then admitted with ankle osteomyelitis and required I+D and hardware removal on 3/31/22.  Operative culture grew MRSA, PICC was placed, and ID planned for vancomycin through .  The patient was also treated with diuresis and RVR control during last admission. Mario Gillis was discharged back to the [facility]. [facility] family says he hasn't been doing well in general. Mario Gillis doesn't like his life anymore because he has lost his mobility.  His dementia has progressed significantly.                Today the patient was in respiratory distress and staff found him hypoxic.  EMS put him on BiPAP and this was continued in the ED.  Labs, exam, and imaging in the ED suggested a combination of pneumonia, CHF, and emphysema.  His LFTs were in the s for unclear reasons, perhaps congestive hepatopathy. Mario Gillis had a severe metabolic acidosis.  Cr was a bit higher.                I had a detailed discussion with the POA (sister) about how the patient was going to need a prolonged course of aggressive medical care.  I explained that even if we were able to get him to survive (which was far from certain), there would probably be a high chance that something else would go wrong in the next few weeks and he would get readmitted again. Christiano Peterson was certain that the patient wouldn't want any further aggressive care at this point. Max Rooney wouldn't want any further BiPAP, procedures, lab draws, or medications geared toward prolonging life. Max Rooney would just want to focus on staying comfortable with medications like morphine and lorazepam.  The patient was admitted for intensive comfort measures and hospice was consulted.            Acute hypoxic respiratory failure, due to combination of acute diastolic CHF, pneumonia with sepsis, and underlying emphysema.  Comfort care.       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  Comfort care. Consults:  IP CONSULT TO HOSPITALIST  IP CONSULT TO HOSPICE    Signed:  Cherise Eckert MD MD   4/23/2022    Thank you Unknown Provider Result (Inactive) for the opportunity to be involved in this patient's care. If you have any questions or concerns, please feel free to contact me at [phone].

## 2022-04-25 LAB
BLOOD CULTURE, ROUTINE: NORMAL
CULTURE, BLOOD 2: NORMAL

## 2022-04-26 ENCOUNTER — TELEPHONE (OUTPATIENT)
Dept: INFECTIOUS DISEASES | Age: 87
End: 2022-04-26

## 2024-06-30 NOTE — PROGRESS NOTES
Dr. Tyrese Warren called back and requested this nurse place an order for an infectious disease consult for osteomyelitis. Order placed. Medication Refill Request     Name Pravastatin   Dose/Frequency 40mg  Quantity 90  Verified pharmacy   [x]  Verified ordering Provider   [x]  Does patient have enough for the next 3 days? Yes [] No []     Medication Refill Request     Name Losartin   Dose/Frequency 100mg  Quantity 90  Verified pharmacy   [x]  Verified ordering Provider   [x]  Does patient have enough for the next 3 days? Yes [] No []     7 day emergency supply also entered for patient as he took his last dose this AM.

## (undated) DEVICE — GAUZE,SPONGE,4"X4",8PLY,STRL,LF,10/TRAY: Brand: MEDLINE

## (undated) DEVICE — Z CONVERTED USE 2273232 BANDAGE COMPR W6INXL11YD E KNIT DBL SELF CLSR EZE-BAND

## (undated) DEVICE — SUTURE VCRL + SZ 3-0 L18IN ABSRB UD SH 1/2 CIR TAPERCUT NDL VCP864D

## (undated) DEVICE — PADDING UNDERCAST W4INXL4YD 100% COT CRIMPED FINISH WBRL II

## (undated) DEVICE — ZIMMER® STERILE DISPOSABLE TOURNIQUET CUFF WITH PLC, DUAL PORT, SINGLE BLADDER, 18 IN. (46 CM)

## (undated) DEVICE — BNDG,ELSTC,MATRIX,STRL,4"X5YD,LF,HOOK&LP: Brand: MEDLINE

## (undated) DEVICE — STERILE PVP: Brand: MEDLINE INDUSTRIES, INC.

## (undated) DEVICE — PACK EXTREMITY XR

## (undated) DEVICE — COTTON UNDERCAST PADDING,CRIMPED FINISH: Brand: WEBRIL

## (undated) DEVICE — 3M™ COBAN™ STERILE SELF-ADHERENT WRAP, 1584S, 4 IN X 5 YD (10 CM X 4,5 M), 18 ROLLS/CASE: Brand: 3M™ COBAN™

## (undated) DEVICE — STERILE LATEX POWDER-FREE SURGICAL GLOVESWITH NITRILE COATING: Brand: PROTEXIS

## (undated) DEVICE — GOWN SIRUS NONREIN XL W/TWL: Brand: MEDLINE INDUSTRIES, INC.

## (undated) DEVICE — SOLUTION IV IRRIG POUR BRL 0.9% SODIUM CHL 2F7124

## (undated) DEVICE — SUTURE ETHLN SZ 3-0 L30IN NONABSORBABLE BLK FSL L30MM 3/8 1671H